# Patient Record
Sex: FEMALE | Employment: FULL TIME | URBAN - METROPOLITAN AREA
[De-identification: names, ages, dates, MRNs, and addresses within clinical notes are randomized per-mention and may not be internally consistent; named-entity substitution may affect disease eponyms.]

---

## 2024-11-20 NOTE — PROGRESS NOTES
History of Present Illness  Chief Complaint   Patient presents with    Right Hip - Pain, New Patient Visit     Xray today       Patient presents with side: right hip pain for 10 years.  It has been worsening over the past  12 months.  The patient localizes the pain predominantly in the groin.  Recently there has been concern for falls and instability.  There is increasing difficulty with activities of daily living and significant disability related to the hip pain.  The patient endorses the following failed non-operative treatments: Rest, ice, elevation, Tylenol, and NSAIDS.   There is increasing frustration with persistent pain and discomfort and decreasing distance of ambulation.    Pain is described as aching, sore, and stiff.  Better with rest, worse with activity.   Pain level: 5  Assistive device: no device  History of surgery on the hip: No  Back pain: No  Numbness or tingling radiating to the lower extremities: No    History reviewed. No pertinent past medical history.    Medication Documentation Review Audit       Reviewed by Mayela Sanz on 11/21/24 at 1641      Medication Order Taking? Sig Documenting Provider Last Dose Status            No Medications to Display                                   No Known Allergies    Social History     Socioeconomic History    Marital status: Single     Spouse name: Not on file    Number of children: Not on file    Years of education: Not on file    Highest education level: Not on file   Occupational History    Not on file   Tobacco Use    Smoking status: Never     Passive exposure: Never    Smokeless tobacco: Never   Substance and Sexual Activity    Alcohol use: Not on file    Drug use: Not on file    Sexual activity: Not on file   Other Topics Concern    Not on file   Social History Narrative    Not on file     Social Drivers of Health     Financial Resource Strain: Low Risk  (11/26/2023)    Received from [facility]    Overall Financial Resource Strain  (CARDIA)     Difficulty of Paying Living Expenses: Not hard at all   Food Insecurity: No Food Insecurity (11/26/2023)    Received from [facility]    Hunger Vital Sign     Worried About Running Out of Food in the Last Year: Never true     Ran Out of Food in the Last Year: Never true   Transportation Needs: No Transportation Needs (11/26/2023)    Received from [facility]    PRAPARE - Transportation     Lack of Transportation (Medical): No     Lack of Transportation (Non-Medical): No   Physical Activity: Unknown (11/26/2023)    Received from [facility]    Exercise Vital Sign     Days of Exercise per Week: 5 days     Minutes of Exercise per Session: Patient declined   Stress: Stress Concern Present (11/26/2023)    Received from [facility]    [place] of Occupational Health - Occupational Stress Questionnaire     Feeling of Stress : To some extent   Social Connections: Unknown (11/26/2023)    Received from [facility]    Social Connection and Isolation Panel [NHANES]     Frequency of Communication with Friends and Family: More than three times a week     Frequency of Social Gatherings with Friends and Family: Once a week     Attends Voodoo Services: Patient declined     Active Member of Clubs or Organizations: Patient declined     Attends Club or Organization Meetings: Patient declined     Marital Status:    Intimate Partner Violence: Not At Risk (11/26/2023)    Received from [facility]    Humiliation, Afraid, Rape, and Kick questionnaire     Fear of Current or Ex-Partner: No     Emotionally Abused: No     Physically Abused: No     Sexually Abused: No   Housing Stability: Low Risk  (11/26/2023)    Received from [facility]    Housing Stability Vital Sign     Unable to Pay for Housing in the Last Year: No     Number of Places Lived in the Last Year: 1     Unstable Housing in the Last Year: No       History reviewed. No pertinent surgical history.    No images are  attached to the encounter.    BMI  26       Review of Systems   GENERAL: Negative for malaise, significant weight loss, fever  MUSCULOSKELETAL: see HPI  NEURO:  Negative     Exam  side: right Hip:  Antalgic gait  Negative Trendelenburg sign  Skin healthy and intact  No tenderness to palpation over lumbar spine  Minimal tenderness over greater trochanter     Range of motion:  Flexion: 120 internal rotation: 20 external rotation: 20 abduction: 30  Pain with: decreased ROM  pain with rotation  No weakness with resisted hip flexion, abduction or adduction     Negative straight leg raise  Neurovascular exam normal distally     Radiographs  XR hip right with pelvis when performed 2 or 3 views  Interpreted By:  Rony Whitley,   STUDY:  XR HIP RIGHT WITH PELVIS WHEN PERFORMED 2 OR 3 VIEWS; ; 11/21/2024  4:15 pm      INDICATION:  Signs/Symptoms:pain.      ACCESSION NUMBER(S):  [id number]      ORDERING CLINICIAN:  RONY WHITLEY      FINDINGS:  Right hip films are negative for fracture, dislocation or destructive  lesion. There is severe degenerative arthrosis of the right hip with  loss of joint space and spurring.          Signed by: Rony Whitley 11/21/2024 4:46 PM  Dictation workstation:   [id number]         Assessment  Osteoarthrosis side: right hip     Plan  We discussed with the patient the diagnosis of severe degenerative joint disease of the hip.  We reviewed an evidence-based approach to osteoarthritis of the hip.  We strongly encouraged low-impact aerobic activity and non-opioid analgesics.  We discussed the role of physical therapy to aid in strengthening and gait training.  We discussed temporary pain relief with corticosteroid injections and the associated risks.      The patient elected for the patient has failed to improve with multiple non-operative modalities.  There is increasing difficulty with activities of daily living and concern for falls.  The patient is endorsing severe pain and disability.       We had a  lengthy discussion regarding total hip arthroplasty including the orthopaedic risks, including but not limited to, instability, infection, hematoma, early aseptic loosening, neurologic or vascular injury, clicking, limb length inequality and incomplete relief of pain.  We reviewed the medical risks, including but not limited to, deep venous thrombosis, pulmonary embolism, and cardiovascular/pulmonary issues.     We discussed the anticipated longevity of the implants and potential for revision surgery.  We also discussed anticoagulation, rehabilitation goals, and the hospital course.  We discussed the likelihood of opioid analgesics and risks associated with them.  The next step is to obtain medical risk stratification and encouraged the pre-operative information seminar at the hospital.  We are happy to provide assistance and counseling if the patient has any additional concerns..

## 2024-11-21 ENCOUNTER — OFFICE VISIT (OUTPATIENT)
Dept: ORTHOPEDIC SURGERY | Facility: CLINIC | Age: 49
End: 2024-11-21
Payer: COMMERCIAL

## 2024-11-21 ENCOUNTER — HOSPITAL ENCOUNTER (OUTPATIENT)
Dept: RADIOLOGY | Facility: CLINIC | Age: 49
Discharge: HOME | End: 2024-11-21
Payer: COMMERCIAL

## 2024-11-21 VITALS — WEIGHT: 152.4 LBS | BODY MASS INDEX: 26.02 KG/M2 | HEIGHT: 64 IN

## 2024-11-21 DIAGNOSIS — M16.11 PRIMARY OSTEOARTHRITIS OF RIGHT HIP: Primary | ICD-10-CM

## 2024-11-21 DIAGNOSIS — M25.551 PAIN OF RIGHT HIP: ICD-10-CM

## 2024-11-21 PROCEDURE — 3008F BODY MASS INDEX DOCD: CPT | Performed by: ORTHOPAEDIC SURGERY

## 2024-11-21 PROCEDURE — 99213 OFFICE O/P EST LOW 20 MIN: CPT | Mod: 57 | Performed by: ORTHOPAEDIC SURGERY

## 2024-11-21 PROCEDURE — 73502 X-RAY EXAM HIP UNI 2-3 VIEWS: CPT | Mod: RT

## 2024-11-21 PROCEDURE — 99204 OFFICE O/P NEW MOD 45 MIN: CPT | Performed by: ORTHOPAEDIC SURGERY

## 2024-11-21 PROCEDURE — 1036F TOBACCO NON-USER: CPT | Performed by: ORTHOPAEDIC SURGERY

## 2024-11-21 RX ORDER — BUPROPION HYDROCHLORIDE 300 MG/1
1 TABLET ORAL
COMMUNITY
Start: 2024-07-23

## 2024-11-21 RX ORDER — ESCITALOPRAM OXALATE 10 MG/1
10 TABLET ORAL
COMMUNITY
Start: 2024-10-03

## 2024-11-21 RX ORDER — TIZANIDINE 2 MG/1
2 TABLET ORAL NIGHTLY
COMMUNITY
Start: 2024-10-04

## 2024-12-05 PROBLEM — M16.11 UNILATERAL PRIMARY OSTEOARTHRITIS, RIGHT HIP: Status: ACTIVE | Noted: 2024-12-04

## 2025-01-27 ENCOUNTER — HOSPITAL ENCOUNTER (OUTPATIENT)
Dept: CARDIOLOGY | Facility: HOSPITAL | Age: 50
Discharge: HOME | End: 2025-01-27
Payer: COMMERCIAL

## 2025-01-27 ENCOUNTER — PRE-ADMISSION TESTING (OUTPATIENT)
Dept: PREADMISSION TESTING | Facility: HOSPITAL | Age: 50
End: 2025-01-27
Payer: COMMERCIAL

## 2025-01-27 VITALS
HEART RATE: 70 BPM | BODY MASS INDEX: 27.63 KG/M2 | DIASTOLIC BLOOD PRESSURE: 78 MMHG | OXYGEN SATURATION: 100 % | HEIGHT: 64 IN | SYSTOLIC BLOOD PRESSURE: 152 MMHG | RESPIRATION RATE: 16 BRPM | WEIGHT: 161.82 LBS

## 2025-01-27 DIAGNOSIS — M16.9 OSTEOARTHRITIS OF HIP: ICD-10-CM

## 2025-01-27 LAB
ALBUMIN SERPL BCP-MCNC: 4.3 G/DL (ref 3.4–5)
ALP SERPL-CCNC: 73 U/L (ref 33–110)
ALT SERPL W P-5'-P-CCNC: 16 U/L (ref 7–45)
ANION GAP SERPL CALC-SCNC: 9 MMOL/L (ref 10–20)
AST SERPL W P-5'-P-CCNC: 16 U/L (ref 9–39)
BASOPHILS # BLD AUTO: 0.07 X10*3/UL (ref 0–0.1)
BASOPHILS NFR BLD AUTO: 0.8 %
BILIRUB SERPL-MCNC: 0.3 MG/DL (ref 0–1.2)
BUN SERPL-MCNC: 16 MG/DL (ref 6–23)
CALCIUM SERPL-MCNC: 9.2 MG/DL (ref 8.6–10.3)
CHLORIDE SERPL-SCNC: 106 MMOL/L (ref 98–107)
CO2 SERPL-SCNC: 27 MMOL/L (ref 21–32)
CREAT SERPL-MCNC: 0.91 MG/DL (ref 0.5–1.05)
EGFRCR SERPLBLD CKD-EPI 2021: 77 ML/MIN/1.73M*2
EOSINOPHIL # BLD AUTO: 0.42 X10*3/UL (ref 0–0.7)
EOSINOPHIL NFR BLD AUTO: 5 %
ERYTHROCYTE [DISTWIDTH] IN BLOOD BY AUTOMATED COUNT: 12 % (ref 11.5–14.5)
EST. AVERAGE GLUCOSE BLD GHB EST-MCNC: 97 MG/DL
GLUCOSE SERPL-MCNC: 94 MG/DL (ref 74–99)
HBA1C MFR BLD: 5 %
HCT VFR BLD AUTO: 41 % (ref 36–46)
HGB BLD-MCNC: 13.9 G/DL (ref 12–16)
IMM GRANULOCYTES # BLD AUTO: 0.03 X10*3/UL (ref 0–0.7)
IMM GRANULOCYTES NFR BLD AUTO: 0.4 % (ref 0–0.9)
INR PPP: 1 (ref 0.9–1.1)
LYMPHOCYTES # BLD AUTO: 1.27 X10*3/UL (ref 1.2–4.8)
LYMPHOCYTES NFR BLD AUTO: 15.3 %
MCH RBC QN AUTO: 31.5 PG (ref 26–34)
MCHC RBC AUTO-ENTMCNC: 33.9 G/DL (ref 32–36)
MCV RBC AUTO: 93 FL (ref 80–100)
MONOCYTES # BLD AUTO: 0.51 X10*3/UL (ref 0.1–1)
MONOCYTES NFR BLD AUTO: 6.1 %
NEUTROPHILS # BLD AUTO: 6.02 X10*3/UL (ref 1.2–7.7)
NEUTROPHILS NFR BLD AUTO: 72.4 %
NRBC BLD-RTO: 0 /100 WBCS (ref 0–0)
PLATELET # BLD AUTO: 298 X10*3/UL (ref 150–450)
POTASSIUM SERPL-SCNC: 4.7 MMOL/L (ref 3.5–5.3)
PROT SERPL-MCNC: 7 G/DL (ref 6.4–8.2)
PROTHROMBIN TIME: 11.7 SECONDS (ref 9.8–12.8)
RBC # BLD AUTO: 4.41 X10*6/UL (ref 4–5.2)
SODIUM SERPL-SCNC: 137 MMOL/L (ref 136–145)
WBC # BLD AUTO: 8.3 X10*3/UL (ref 4.4–11.3)

## 2025-01-27 PROCEDURE — 85610 PROTHROMBIN TIME: CPT

## 2025-01-27 PROCEDURE — 87081 CULTURE SCREEN ONLY: CPT | Mod: ELYLAB

## 2025-01-27 PROCEDURE — 83036 HEMOGLOBIN GLYCOSYLATED A1C: CPT | Mod: ELYLAB

## 2025-01-27 PROCEDURE — 36415 COLL VENOUS BLD VENIPUNCTURE: CPT

## 2025-01-27 PROCEDURE — 93010 ELECTROCARDIOGRAM REPORT: CPT | Performed by: INTERNAL MEDICINE

## 2025-01-27 PROCEDURE — 93005 ELECTROCARDIOGRAM TRACING: CPT

## 2025-01-27 PROCEDURE — 85025 COMPLETE CBC W/AUTO DIFF WBC: CPT

## 2025-01-27 PROCEDURE — 84075 ASSAY ALKALINE PHOSPHATASE: CPT

## 2025-01-27 RX ORDER — CEPHALEXIN 500 MG/1
500 CAPSULE ORAL SEE ADMIN INSTRUCTIONS
COMMUNITY
Start: 2024-05-08

## 2025-01-27 NOTE — PREPROCEDURE INSTRUCTIONS
KNEE & HIP JOINT REPLACEMENT PRE-OPERATIVE INSTRUCTIONS     You will receive notification one business day prior to your surgery to confirm your arrival time and any additional information between 2 P.M. - 5 P.M. It is important that you answer your phone and/or check your messages during this time.     You may see in [facility] [employer] your surgery start time change several times even up to the day before your procedure. Please disregard those times and only follow the time given by the  who will be notifying you via phone and not a text.     Please arrive at your scheduled time to avoid delay or cancelled surgery.     Please enter the building through either the Main Entrance in front of the hospital or from the Parking Garage Walk Way Bridge. From the parking garage, which is free, take the 2nd Floor Walkway Bridge into the hospital and check in at the [facility] Desk as you enter the hospital directly in front of you. If you enter through the Main Entrance take the elevator off the lobby on the right labeled “A” to the 2nd floor and check in at the [facility] desk as you exit the elevator. Wheelchairs are available for use if using the Main Entrance.     Handicap parking in the land lot, in front of hospital by main entrance, wheelchairs are available at this entrance     ?   INSTRUCTIONS:   Please contact your doctor’s office, who is doing procedure, about any changes in your health, bad cold, fever, sore throat, or COVID within last 4 weeks     Talk to your surgeon for instructions if you should stop your aspirin, blood thinner, diabetes medicines, weight loss medications, multivitamins or over the counter supplements since many surgeons have you adjust or stop these medications prior to procedure. The doctor’s office may have you contact the prescribing doctor for medication adjustments for your surgery.     If you take certain medications like Beta Blocker or Anti-Seizure  medication, you may have to take them the morning of procedure with a sip of water. If this is the case your surgeons office should let you know, and the PAT nurses will follow up when they speak to you to verify you are aware.     If not staying overnight after your surgery, and you are receiving any type of anesthesia with your surgery, you must have an adult (age 18 or older) immediately available to drive you home after surgery or your procedure will be cancelled. You may be discharged home after surgery per an Uber, Lyft, Taxi or any other transportation service as long as the responsible adult (18 or older) is in the vehicle with you at time of discharge. The  of these transportation services is not responsible for your care and cannot be consider a responsible adult. We also highly recommend you have someone stay with you for the entire day and night of your surgery.     All jewelry and piercings must be removed. If you are unable to remove an item or have a dermal piercing, please be sure to tell the nurse when you arrive for surgery.     Nail polish must be removed off one finger of each hand     Make-up or other beauty products (lotion, deodorant, hairspray, perfume, etc.) must be removed or not used for day of surgery.     Avoid smoking, consuming alcohol, or any medical or recreational drug use for 24 hours before surgery.     Do not wear contacts to hospital, bring your glasses and a case     Leave valuables at home except photo ID, insurance card and any co-payment that has been requested by hospital.     For adults who are unable to consent or make medical decisions for themselves, a legal guardian or Power of  must accompany them to the surgery center. If this is not possible, please call [phone] to make additional arrangements.  If there is any guardianship or legal Power of  paperwork, please bring them the day of surgery.     Wear comfortable, loose fitting clothing into  the procedure.  While your admitted you are asked to bring short sleeve shirts, shorts (loose like pajamas, sweat shorts), and tennis shoes/sneakers.  No slip on shoes.     Please bring your 2-Wheeled Walker with you the day of surgery and the [facility] Booklet that was given to you during your PAT appointment.     The nurse practitioners, , , physical therapist, and occupational therapist will all discuss and work with you during your stay to help with discharge, physical therapy and any other needs. They also may discuss a program called Meds to Beds, where postoperative medications prescribed to you after surgery will be filled and delivered to your beside before you leave, so that you do not need to stop at the pharmacy on the way home    If you use a CPAP or BIPAP, please make sure the PAT nurse was able to get the settings from you, if not please inform the nurses the day of surgery of your settings you use at home.     Additional instructions about eating and drinking before surgery:     Do not eat any solid foods?or drink anything after midnight the night prior to your procedure. Milk, nutritional drinks/supplements, and infant formula are considered solid foods.  This also includes no gum, candy, lozenges, ice, or any other oral consumption.     CHG SOAP & ORAL RINSE   In regards to bathing, please follow the instructions explained to you at the PAT appointment about taking a showering with the CHG soap the 5 nights prior to your surgery.       During your PAT Appointment you were given [employer] CHG Wash Soap (bottles of bubble gum pink soap) to use before procedure.  Begin using the CHG soap 5 days before your scheduled surgery.  Be sure to sleep on clean sheets - change your sheets the first night you do this cleansing process (you don't not need to change them every night).     CHG SOAP INSTRUCTIONS:  Begin using the CHG soap 5 days prior to your scheduled  surgery.  You will wash and rinse your face and genitals with normal soap.  The night before surgery is the ONLY TIME you use the CHG SOAP for your hair, and do NOT use conditioner after washing with the CHG Soap.  For the rest of the showers the 5 days prior to surgery you will use normal shampoo.  Hair extensions should be removed.  Then apply CHG soap solution to a clean wet washcloth.     Turn the water off or move away from the water spray to avoid premature rinsing of the CHG soap as you apply it.   Avoid getting the CHG soap in your ears, eyes, and mouth.  Apply the CHG soap to entire body from the neck down EXCEPT your face and genitals.  Allow the CHG soap to sit for 3 minutes on your skin.  Then turn on water and rinse the CHG solution off your body completely.    DO NOT wash with regular soap after you have used the CHG soap   Pat yourself dry with a clean, fresh-laundered towel when you get out of the shower and clean Pj's  DO NOT apply any powders, deodorants, or lotions after shower   Be aware the CHG soap will stain fabrics if you wash them with bleach after use.   Make sure to pay special attention to cleaning area(s) where your incision(s) will be located. Avoid scrubbing your skin to hard.  You will repeat this same process steps 1-12 for each shower you take prior to surgery.  The morning of  the surgery is the fifth day.      ORAL RINSE   You will receive a call or notification from your pharmacy to  a prescription prior to procedure.  Be sure to  the prescription oral rinse from your local pharmacy.   You will be using the oral rinse the night before and the morning of surgery.    Take a cap full of the solution, 15mL   Swish (gargle if you can) the mouthwash in your mouth for at least 30 seconds, (DO NOT SWALLOW), and spit out   After the oral CHG rinse, do not rinse your mouth with water, drink, or eat.      If you have to take a medication the morning of surgery as instructed by  your surgeon- please take that medication with a sip of water prior to doing the oral rinse the day of surgery.       ?If you have any questions or concerns, please call Pre-Admission Testing at [phone] or your Physician’s office   Dr. Rony Whitley  [phone]  [facility] General Line: [phone]

## 2025-01-29 LAB — STAPHYLOCOCCUS SPEC CULT: NORMAL

## 2025-01-31 DIAGNOSIS — Z01.818 PRE-OPERATIVE CLEARANCE: Primary | ICD-10-CM

## 2025-01-31 LAB
ATRIAL RATE: 54 BPM
P AXIS: 72 DEGREES
P OFFSET: 193 MS
P ONSET: 137 MS
PR INTERVAL: 162 MS
Q ONSET: 218 MS
QRS COUNT: 9 BEATS
QRS DURATION: 86 MS
QT INTERVAL: 422 MS
QTC CALCULATION(BAZETT): 400 MS
QTC FREDERICIA: 407 MS
R AXIS: 69 DEGREES
T AXIS: 57 DEGREES
T OFFSET: 429 MS
VENTRICULAR RATE: 54 BPM

## 2025-01-31 RX ORDER — CHLORHEXIDINE GLUCONATE ORAL RINSE 1.2 MG/ML
15 SOLUTION DENTAL DAILY
Qty: 30 ML | Refills: 0 | Status: SHIPPED | OUTPATIENT
Start: 2025-01-31 | End: 2025-02-02

## 2025-02-05 ENCOUNTER — TELEPHONE (OUTPATIENT)
Dept: ORTHOPEDIC SURGERY | Facility: CLINIC | Age: 50
End: 2025-02-05
Payer: COMMERCIAL

## 2025-02-10 ENCOUNTER — ANESTHESIA (OUTPATIENT)
Dept: OPERATING ROOM | Facility: HOSPITAL | Age: 50
End: 2025-02-10
Payer: COMMERCIAL

## 2025-02-10 ENCOUNTER — APPOINTMENT (OUTPATIENT)
Dept: RADIOLOGY | Facility: HOSPITAL | Age: 50
End: 2025-02-10
Payer: COMMERCIAL

## 2025-02-10 ENCOUNTER — HOSPITAL ENCOUNTER (OUTPATIENT)
Facility: HOSPITAL | Age: 50
Discharge: HOME | End: 2025-02-11
Attending: ORTHOPAEDIC SURGERY | Admitting: ORTHOPAEDIC SURGERY
Payer: COMMERCIAL

## 2025-02-10 ENCOUNTER — ANESTHESIA EVENT (OUTPATIENT)
Dept: OPERATING ROOM | Facility: HOSPITAL | Age: 50
End: 2025-02-10
Payer: COMMERCIAL

## 2025-02-10 DIAGNOSIS — M16.11 UNILATERAL PRIMARY OSTEOARTHRITIS, RIGHT HIP: Primary | ICD-10-CM

## 2025-02-10 LAB — PREGNANCY TEST URINE, POC: NEGATIVE

## 2025-02-10 PROCEDURE — 2500000001 HC RX 250 WO HCPCS SELF ADMINISTERED DRUGS (ALT 637 FOR MEDICARE OP): Performed by: PHYSICIAN ASSISTANT

## 2025-02-10 PROCEDURE — 7100000001 HC RECOVERY ROOM TIME - INITIAL BASE CHARGE: Performed by: ORTHOPAEDIC SURGERY

## 2025-02-10 PROCEDURE — 2500000001 HC RX 250 WO HCPCS SELF ADMINISTERED DRUGS (ALT 637 FOR MEDICARE OP): Performed by: STUDENT IN AN ORGANIZED HEALTH CARE EDUCATION/TRAINING PROGRAM

## 2025-02-10 PROCEDURE — C1713 ANCHOR/SCREW BN/BN,TIS/BN: HCPCS | Performed by: ORTHOPAEDIC SURGERY

## 2025-02-10 PROCEDURE — 3600000017 HC OR TIME - EACH INCREMENTAL 1 MINUTE - PROCEDURE LEVEL SIX: Performed by: ORTHOPAEDIC SURGERY

## 2025-02-10 PROCEDURE — 3600000018 HC OR TIME - INITIAL BASE CHARGE - PROCEDURE LEVEL SIX: Performed by: ORTHOPAEDIC SURGERY

## 2025-02-10 PROCEDURE — 2500000005 HC RX 250 GENERAL PHARMACY W/O HCPCS: Performed by: ORTHOPAEDIC SURGERY

## 2025-02-10 PROCEDURE — 72170 X-RAY EXAM OF PELVIS: CPT | Performed by: RADIOLOGY

## 2025-02-10 PROCEDURE — 7100000011 HC EXTENDED STAY RECOVERY HOURLY - NURSING UNIT

## 2025-02-10 PROCEDURE — 2500000004 HC RX 250 GENERAL PHARMACY W/ HCPCS (ALT 636 FOR OP/ED): Performed by: PHYSICIAN ASSISTANT

## 2025-02-10 PROCEDURE — 2500000001 HC RX 250 WO HCPCS SELF ADMINISTERED DRUGS (ALT 637 FOR MEDICARE OP): Performed by: ORTHOPAEDIC SURGERY

## 2025-02-10 PROCEDURE — 2500000004 HC RX 250 GENERAL PHARMACY W/ HCPCS (ALT 636 FOR OP/ED): Performed by: ORTHOPAEDIC SURGERY

## 2025-02-10 PROCEDURE — 2500000004 HC RX 250 GENERAL PHARMACY W/ HCPCS (ALT 636 FOR OP/ED): Performed by: STUDENT IN AN ORGANIZED HEALTH CARE EDUCATION/TRAINING PROGRAM

## 2025-02-10 PROCEDURE — 81025 URINE PREGNANCY TEST: CPT | Performed by: PHYSICIAN ASSISTANT

## 2025-02-10 PROCEDURE — C1776 JOINT DEVICE (IMPLANTABLE): HCPCS | Performed by: ORTHOPAEDIC SURGERY

## 2025-02-10 PROCEDURE — 3700000001 HC GENERAL ANESTHESIA TIME - INITIAL BASE CHARGE: Performed by: ORTHOPAEDIC SURGERY

## 2025-02-10 PROCEDURE — 2720000007 HC OR 272 NO HCPCS: Performed by: ORTHOPAEDIC SURGERY

## 2025-02-10 PROCEDURE — 2780000003 HC OR 278 NO HCPCS: Performed by: ORTHOPAEDIC SURGERY

## 2025-02-10 PROCEDURE — 2500000001 HC RX 250 WO HCPCS SELF ADMINISTERED DRUGS (ALT 637 FOR MEDICARE OP)

## 2025-02-10 PROCEDURE — RXMED WILLOW AMBULATORY MEDICATION CHARGE

## 2025-02-10 PROCEDURE — 72170 X-RAY EXAM OF PELVIS: CPT

## 2025-02-10 PROCEDURE — 3700000002 HC GENERAL ANESTHESIA TIME - EACH INCREMENTAL 1 MINUTE: Performed by: ORTHOPAEDIC SURGERY

## 2025-02-10 PROCEDURE — A6213 FOAM DRG >16<=48 SQ IN W/BDR: HCPCS | Performed by: ORTHOPAEDIC SURGERY

## 2025-02-10 PROCEDURE — 2500000002 HC RX 250 W HCPCS SELF ADMINISTERED DRUGS (ALT 637 FOR MEDICARE OP, ALT 636 FOR OP/ED): Performed by: ORTHOPAEDIC SURGERY

## 2025-02-10 PROCEDURE — 2500000004 HC RX 250 GENERAL PHARMACY W/ HCPCS (ALT 636 FOR OP/ED): Performed by: NURSE ANESTHETIST, CERTIFIED REGISTERED

## 2025-02-10 PROCEDURE — 2500000002 HC RX 250 W HCPCS SELF ADMINISTERED DRUGS (ALT 637 FOR MEDICARE OP, ALT 636 FOR OP/ED): Performed by: PHYSICIAN ASSISTANT

## 2025-02-10 PROCEDURE — 2500000005 HC RX 250 GENERAL PHARMACY W/O HCPCS: Performed by: PHYSICIAN ASSISTANT

## 2025-02-10 PROCEDURE — 7100000002 HC RECOVERY ROOM TIME - EACH INCREMENTAL 1 MINUTE: Performed by: ORTHOPAEDIC SURGERY

## 2025-02-10 DEVICE — 6.5MM LOW PROFILE HEX SCREW 25MM
Type: IMPLANTABLE DEVICE | Site: HIP | Status: FUNCTIONAL
Brand: TRIDENT II

## 2025-02-10 DEVICE — TRIDENT II TRITANIUM CLUSTER 54E
Type: IMPLANTABLE DEVICE | Site: HIP | Status: FUNCTIONAL
Brand: TRIDENT II

## 2025-02-10 DEVICE — TRIDENT X3 0 DEGREE POLYETHYLENE INSERT
Type: IMPLANTABLE DEVICE | Site: HIP | Status: FUNCTIONAL
Brand: TRIDENT X3 INSERT

## 2025-02-10 DEVICE — STEM, COLLARD, INSIGNIA, 2 HIGH: Type: IMPLANTABLE DEVICE | Site: HIP | Status: FUNCTIONAL

## 2025-02-10 DEVICE — CERAMIC V40 FEMORAL HEAD
Type: IMPLANTABLE DEVICE | Site: HIP | Status: FUNCTIONAL
Brand: BIOLOX

## 2025-02-10 RX ORDER — ONDANSETRON 4 MG/1
4 TABLET, ORALLY DISINTEGRATING ORAL EVERY 8 HOURS PRN
Status: DISCONTINUED | OUTPATIENT
Start: 2025-02-10 | End: 2025-02-11 | Stop reason: HOSPADM

## 2025-02-10 RX ORDER — GLYCOPYRROLATE 0.2 MG/ML
INJECTION INTRAMUSCULAR; INTRAVENOUS AS NEEDED
Status: DISCONTINUED | OUTPATIENT
Start: 2025-02-10 | End: 2025-02-10

## 2025-02-10 RX ORDER — ACETAMINOPHEN 325 MG/1
650 TABLET ORAL EVERY 6 HOURS SCHEDULED
Status: DISCONTINUED | OUTPATIENT
Start: 2025-02-10 | End: 2025-02-11 | Stop reason: HOSPADM

## 2025-02-10 RX ORDER — CEFAZOLIN SODIUM 2 G/100ML
2 INJECTION, SOLUTION INTRAVENOUS ONCE
Status: COMPLETED | OUTPATIENT
Start: 2025-02-10 | End: 2025-02-10

## 2025-02-10 RX ORDER — CEFAZOLIN SODIUM 2 G/50ML
2 SOLUTION INTRAVENOUS EVERY 8 HOURS
Status: DISCONTINUED | OUTPATIENT
Start: 2025-02-10 | End: 2025-02-10

## 2025-02-10 RX ORDER — PROCHLORPERAZINE EDISYLATE 5 MG/ML
10 INJECTION INTRAMUSCULAR; INTRAVENOUS EVERY 6 HOURS PRN
Status: DISCONTINUED | OUTPATIENT
Start: 2025-02-10 | End: 2025-02-11 | Stop reason: HOSPADM

## 2025-02-10 RX ORDER — TRANEXAMIC ACID 650 MG/1
1950 TABLET ORAL ONCE
Status: COMPLETED | OUTPATIENT
Start: 2025-02-10 | End: 2025-02-10

## 2025-02-10 RX ORDER — HYDROCODONE BITARTRATE AND ACETAMINOPHEN 5; 325 MG/1; MG/1
1 TABLET ORAL EVERY 6 HOURS PRN
Status: DISCONTINUED | OUTPATIENT
Start: 2025-02-10 | End: 2025-02-11 | Stop reason: HOSPADM

## 2025-02-10 RX ORDER — OXYCODONE HYDROCHLORIDE 5 MG/1
10 TABLET ORAL EVERY 4 HOURS PRN
Status: DISCONTINUED | OUTPATIENT
Start: 2025-02-10 | End: 2025-02-10

## 2025-02-10 RX ORDER — PROCHLORPERAZINE 25 MG/1
25 SUPPOSITORY RECTAL EVERY 12 HOURS PRN
Status: DISCONTINUED | OUTPATIENT
Start: 2025-02-10 | End: 2025-02-11 | Stop reason: HOSPADM

## 2025-02-10 RX ORDER — PROCHLORPERAZINE MALEATE 5 MG
10 TABLET ORAL EVERY 6 HOURS PRN
Status: DISCONTINUED | OUTPATIENT
Start: 2025-02-10 | End: 2025-02-11 | Stop reason: HOSPADM

## 2025-02-10 RX ORDER — OXYCODONE HYDROCHLORIDE 5 MG/1
10 TABLET ORAL EVERY 6 HOURS PRN
Status: DISCONTINUED | OUTPATIENT
Start: 2025-02-10 | End: 2025-02-10

## 2025-02-10 RX ORDER — FENTANYL CITRATE 50 UG/ML
25 INJECTION, SOLUTION INTRAMUSCULAR; INTRAVENOUS EVERY 5 MIN PRN
Status: DISCONTINUED | OUTPATIENT
Start: 2025-02-10 | End: 2025-02-10 | Stop reason: HOSPADM

## 2025-02-10 RX ORDER — ASPIRIN 81 MG/1
81 TABLET ORAL 2 TIMES DAILY
Status: DISCONTINUED | OUTPATIENT
Start: 2025-02-10 | End: 2025-02-11 | Stop reason: HOSPADM

## 2025-02-10 RX ORDER — BUPIVACAINE HYDROCHLORIDE 7.5 MG/ML
INJECTION, SOLUTION INTRASPINAL AS NEEDED
Status: DISCONTINUED | OUTPATIENT
Start: 2025-02-10 | End: 2025-02-10

## 2025-02-10 RX ORDER — DOCUSATE SODIUM 100 MG/1
100 CAPSULE, LIQUID FILLED ORAL 2 TIMES DAILY
Status: DISCONTINUED | OUTPATIENT
Start: 2025-02-10 | End: 2025-02-11 | Stop reason: HOSPADM

## 2025-02-10 RX ORDER — OXYCODONE HYDROCHLORIDE 5 MG/1
5 TABLET ORAL EVERY 4 HOURS PRN
Status: DISCONTINUED | OUTPATIENT
Start: 2025-02-10 | End: 2025-02-10

## 2025-02-10 RX ORDER — SODIUM CHLORIDE 0.9 G/100ML
INJECTION, SOLUTION IRRIGATION AS NEEDED
Status: DISCONTINUED | OUTPATIENT
Start: 2025-02-10 | End: 2025-02-10 | Stop reason: HOSPADM

## 2025-02-10 RX ORDER — CEFAZOLIN SODIUM 2 G/100ML
2 INJECTION, SOLUTION INTRAVENOUS EVERY 8 HOURS
Status: COMPLETED | OUTPATIENT
Start: 2025-02-10 | End: 2025-02-11

## 2025-02-10 RX ORDER — DIPHENHYDRAMINE HYDROCHLORIDE 50 MG/ML
12.5 INJECTION INTRAMUSCULAR; INTRAVENOUS ONCE AS NEEDED
Status: DISCONTINUED | OUTPATIENT
Start: 2025-02-10 | End: 2025-02-10 | Stop reason: HOSPADM

## 2025-02-10 RX ORDER — WATER 1 ML/ML
IRRIGANT IRRIGATION AS NEEDED
Status: DISCONTINUED | OUTPATIENT
Start: 2025-02-10 | End: 2025-02-10 | Stop reason: HOSPADM

## 2025-02-10 RX ORDER — LIDOCAINE HYDROCHLORIDE 20 MG/ML
INJECTION, SOLUTION INFILTRATION; PERINEURAL AS NEEDED
Status: DISCONTINUED | OUTPATIENT
Start: 2025-02-10 | End: 2025-02-10

## 2025-02-10 RX ORDER — CELECOXIB 200 MG/1
200 CAPSULE ORAL ONCE
Status: COMPLETED | OUTPATIENT
Start: 2025-02-10 | End: 2025-02-10

## 2025-02-10 RX ORDER — BISACODYL 5 MG
10 TABLET, DELAYED RELEASE (ENTERIC COATED) ORAL DAILY PRN
Status: DISCONTINUED | OUTPATIENT
Start: 2025-02-10 | End: 2025-02-11 | Stop reason: HOSPADM

## 2025-02-10 RX ORDER — ALBUTEROL SULFATE 0.83 MG/ML
2.5 SOLUTION RESPIRATORY (INHALATION) ONCE AS NEEDED
Status: DISCONTINUED | OUTPATIENT
Start: 2025-02-10 | End: 2025-02-10 | Stop reason: HOSPADM

## 2025-02-10 RX ORDER — CYCLOBENZAPRINE HCL 10 MG
10 TABLET ORAL 3 TIMES DAILY PRN
Status: DISCONTINUED | OUTPATIENT
Start: 2025-02-10 | End: 2025-02-11 | Stop reason: HOSPADM

## 2025-02-10 RX ORDER — HYDRALAZINE HYDROCHLORIDE 20 MG/ML
5 INJECTION INTRAMUSCULAR; INTRAVENOUS EVERY 30 MIN PRN
Status: DISCONTINUED | OUTPATIENT
Start: 2025-02-10 | End: 2025-02-10 | Stop reason: HOSPADM

## 2025-02-10 RX ORDER — FAMOTIDINE 10 MG/ML
INJECTION INTRAVENOUS AS NEEDED
Status: DISCONTINUED | OUTPATIENT
Start: 2025-02-10 | End: 2025-02-10

## 2025-02-10 RX ORDER — SODIUM CHLORIDE, SODIUM LACTATE, POTASSIUM CHLORIDE, CALCIUM CHLORIDE 600; 310; 30; 20 MG/100ML; MG/100ML; MG/100ML; MG/100ML
50 INJECTION, SOLUTION INTRAVENOUS CONTINUOUS
Status: ACTIVE | OUTPATIENT
Start: 2025-02-10 | End: 2025-02-11

## 2025-02-10 RX ORDER — TRANEXAMIC ACID 650 MG/1
1950 TABLET ORAL ONCE
Status: COMPLETED | OUTPATIENT
Start: 2025-02-11 | End: 2025-02-11

## 2025-02-10 RX ORDER — NALOXONE HYDROCHLORIDE 1 MG/ML
0.2 INJECTION INTRAMUSCULAR; INTRAVENOUS; SUBCUTANEOUS EVERY 5 MIN PRN
Status: DISCONTINUED | OUTPATIENT
Start: 2025-02-10 | End: 2025-02-11 | Stop reason: HOSPADM

## 2025-02-10 RX ORDER — LABETALOL HYDROCHLORIDE 5 MG/ML
5 INJECTION, SOLUTION INTRAVENOUS ONCE AS NEEDED
Status: DISCONTINUED | OUTPATIENT
Start: 2025-02-10 | End: 2025-02-10 | Stop reason: HOSPADM

## 2025-02-10 RX ORDER — PROPOFOL 10 MG/ML
INJECTION, EMULSION INTRAVENOUS AS NEEDED
Status: DISCONTINUED | OUTPATIENT
Start: 2025-02-10 | End: 2025-02-10

## 2025-02-10 RX ORDER — SODIUM CHLORIDE, SODIUM LACTATE, POTASSIUM CHLORIDE, CALCIUM CHLORIDE 600; 310; 30; 20 MG/100ML; MG/100ML; MG/100ML; MG/100ML
50 INJECTION, SOLUTION INTRAVENOUS CONTINUOUS
Status: DISCONTINUED | OUTPATIENT
Start: 2025-02-10 | End: 2025-02-11 | Stop reason: HOSPADM

## 2025-02-10 RX ORDER — MIDAZOLAM HYDROCHLORIDE 1 MG/ML
INJECTION, SOLUTION INTRAMUSCULAR; INTRAVENOUS AS NEEDED
Status: DISCONTINUED | OUTPATIENT
Start: 2025-02-10 | End: 2025-02-10

## 2025-02-10 RX ORDER — ONDANSETRON HYDROCHLORIDE 2 MG/ML
4 INJECTION, SOLUTION INTRAVENOUS EVERY 8 HOURS PRN
Status: DISCONTINUED | OUTPATIENT
Start: 2025-02-10 | End: 2025-02-11 | Stop reason: HOSPADM

## 2025-02-10 RX ORDER — MEPERIDINE HYDROCHLORIDE 25 MG/ML
12.5 INJECTION INTRAMUSCULAR; INTRAVENOUS; SUBCUTANEOUS EVERY 10 MIN PRN
Status: DISCONTINUED | OUTPATIENT
Start: 2025-02-10 | End: 2025-02-10 | Stop reason: HOSPADM

## 2025-02-10 RX ORDER — SODIUM CHLORIDE, SODIUM LACTATE, POTASSIUM CHLORIDE, CALCIUM CHLORIDE 600; 310; 30; 20 MG/100ML; MG/100ML; MG/100ML; MG/100ML
100 INJECTION, SOLUTION INTRAVENOUS CONTINUOUS
Status: DISCONTINUED | OUTPATIENT
Start: 2025-02-10 | End: 2025-02-10 | Stop reason: HOSPADM

## 2025-02-10 RX ORDER — HYDROCODONE BITARTRATE AND ACETAMINOPHEN 5; 325 MG/1; MG/1
2 TABLET ORAL EVERY 6 HOURS PRN
Status: DISCONTINUED | OUTPATIENT
Start: 2025-02-10 | End: 2025-02-11 | Stop reason: HOSPADM

## 2025-02-10 RX ORDER — KETOROLAC TROMETHAMINE 30 MG/ML
15 INJECTION, SOLUTION INTRAMUSCULAR; INTRAVENOUS EVERY 6 HOURS
Status: COMPLETED | OUTPATIENT
Start: 2025-02-10 | End: 2025-02-11

## 2025-02-10 RX ORDER — ONDANSETRON HYDROCHLORIDE 2 MG/ML
4 INJECTION, SOLUTION INTRAVENOUS ONCE AS NEEDED
Status: DISCONTINUED | OUTPATIENT
Start: 2025-02-10 | End: 2025-02-10 | Stop reason: HOSPADM

## 2025-02-10 RX ORDER — ACETAMINOPHEN 325 MG/1
650 TABLET ORAL ONCE
Status: COMPLETED | OUTPATIENT
Start: 2025-02-10 | End: 2025-02-10

## 2025-02-10 RX ORDER — ONDANSETRON HYDROCHLORIDE 2 MG/ML
INJECTION, SOLUTION INTRAVENOUS AS NEEDED
Status: DISCONTINUED | OUTPATIENT
Start: 2025-02-10 | End: 2025-02-10

## 2025-02-10 RX ADMIN — OXYCODONE HYDROCHLORIDE 5 MG: 5 TABLET ORAL at 16:30

## 2025-02-10 RX ADMIN — KETOROLAC TROMETHAMINE 15 MG: 30 INJECTION, SOLUTION INTRAMUSCULAR at 23:20

## 2025-02-10 RX ADMIN — ACETAMINOPHEN 650 MG: 325 TABLET ORAL at 10:32

## 2025-02-10 RX ADMIN — ONDANSETRON 4 MG: 2 INJECTION INTRAMUSCULAR; INTRAVENOUS at 19:44

## 2025-02-10 RX ADMIN — MIDAZOLAM 2 MG: 1 INJECTION INTRAMUSCULAR; INTRAVENOUS at 13:18

## 2025-02-10 RX ADMIN — CEFAZOLIN SODIUM 2 G: 2 INJECTION, SOLUTION INTRAVENOUS at 13:33

## 2025-02-10 RX ADMIN — CELECOXIB 200 MG: 200 CAPSULE ORAL at 10:32

## 2025-02-10 RX ADMIN — ASPIRIN 81 MG: 81 TABLET, COATED ORAL at 21:44

## 2025-02-10 RX ADMIN — BUPIVACAINE HYDROCHLORIDE IN DEXTROSE 1.8 ML: 7.5 INJECTION, SOLUTION SUBARACHNOID at 13:28

## 2025-02-10 RX ADMIN — GLYCOPYRROLATE 0.2 MG: 0.2 INJECTION, SOLUTION INTRAMUSCULAR; INTRAVENOUS at 16:20

## 2025-02-10 RX ADMIN — PROPOFOL 50 MG: 10 INJECTION, EMULSION INTRAVENOUS at 13:32

## 2025-02-10 RX ADMIN — FAMOTIDINE 20 MG: 10 INJECTION, SOLUTION INTRAVENOUS at 13:35

## 2025-02-10 RX ADMIN — POVIDONE-IODINE 1 APPLICATION: 5 SOLUTION TOPICAL at 10:32

## 2025-02-10 RX ADMIN — HYDROMORPHONE HYDROCHLORIDE 0.5 MG: 1 INJECTION, SOLUTION INTRAMUSCULAR; INTRAVENOUS; SUBCUTANEOUS at 18:22

## 2025-02-10 RX ADMIN — TRANEXAMIC ACID 1950 MG: 650 TABLET ORAL at 10:32

## 2025-02-10 RX ADMIN — ACETAMINOPHEN 650 MG: 325 TABLET ORAL at 17:31

## 2025-02-10 RX ADMIN — KETOROLAC TROMETHAMINE 15 MG: 30 INJECTION, SOLUTION INTRAMUSCULAR at 17:31

## 2025-02-10 RX ADMIN — HYDROCODONE BITARTRATE AND ACETAMINOPHEN 2 TABLET: 5; 325 TABLET ORAL at 21:44

## 2025-02-10 RX ADMIN — DOCUSATE SODIUM 100 MG: 100 CAPSULE, LIQUID FILLED ORAL at 21:44

## 2025-02-10 RX ADMIN — LIDOCAINE HYDROCHLORIDE 60 MG: 20 INJECTION, SOLUTION INFILTRATION; PERINEURAL at 13:32

## 2025-02-10 RX ADMIN — PROPOFOL 30 MG: 10 INJECTION, EMULSION INTRAVENOUS at 13:34

## 2025-02-10 RX ADMIN — TRANEXAMIC ACID 1950 MG: 650 TABLET ORAL at 21:44

## 2025-02-10 RX ADMIN — SODIUM CHLORIDE, POTASSIUM CHLORIDE, SODIUM LACTATE AND CALCIUM CHLORIDE 50 ML/HR: 600; 310; 30; 20 INJECTION, SOLUTION INTRAVENOUS at 10:33

## 2025-02-10 RX ADMIN — SODIUM CHLORIDE, POTASSIUM CHLORIDE, SODIUM LACTATE AND CALCIUM CHLORIDE 50 ML/HR: 600; 310; 30; 20 INJECTION, SOLUTION INTRAVENOUS at 17:40

## 2025-02-10 RX ADMIN — CEFAZOLIN SODIUM 2 G: 2 INJECTION, SOLUTION INTRAVENOUS at 21:44

## 2025-02-10 RX ADMIN — PROPOFOL 80 MCG/KG/MIN: 10 INJECTION, EMULSION INTRAVENOUS at 13:33

## 2025-02-10 RX ADMIN — Medication 2 L/MIN: at 18:22

## 2025-02-10 RX ADMIN — ONDANSETRON 4 MG: 2 INJECTION INTRAMUSCULAR; INTRAVENOUS at 13:37

## 2025-02-10 RX ADMIN — ACETAMINOPHEN 650 MG: 325 TABLET ORAL at 23:20

## 2025-02-10 SDOH — SOCIAL STABILITY: SOCIAL INSECURITY: ARE THERE ANY APPARENT SIGNS OF INJURIES/BEHAVIORS THAT COULD BE RELATED TO ABUSE/NEGLECT?: NO

## 2025-02-10 SDOH — SOCIAL STABILITY: SOCIAL INSECURITY: DO YOU FEEL ANYONE HAS EXPLOITED OR TAKEN ADVANTAGE OF YOU FINANCIALLY OR OF YOUR PERSONAL PROPERTY?: NO

## 2025-02-10 SDOH — SOCIAL STABILITY: SOCIAL INSECURITY
WITHIN THE LAST YEAR, HAVE YOU BEEN KICKED, HIT, SLAPPED, OR OTHERWISE PHYSICALLY HURT BY YOUR PARTNER OR EX-PARTNER?: NO

## 2025-02-10 SDOH — SOCIAL STABILITY: SOCIAL INSECURITY: HAVE YOU HAD ANY THOUGHTS OF HARMING ANYONE ELSE?: NO

## 2025-02-10 SDOH — ECONOMIC STABILITY: HOUSING INSECURITY: DO YOU FEEL UNSAFE GOING BACK TO THE PLACE WHERE YOU LIVE?: NO

## 2025-02-10 SDOH — ECONOMIC STABILITY: INCOME INSECURITY: IN THE PAST 12 MONTHS HAS THE ELECTRIC, GAS, OIL, OR WATER COMPANY THREATENED TO SHUT OFF SERVICES IN YOUR HOME?: NO

## 2025-02-10 SDOH — SOCIAL STABILITY: SOCIAL INSECURITY: HAVE YOU HAD THOUGHTS OF HARMING ANYONE ELSE?: NO

## 2025-02-10 SDOH — ECONOMIC STABILITY: FOOD INSECURITY: WITHIN THE PAST 12 MONTHS, THE FOOD YOU BOUGHT JUST DIDN'T LAST AND YOU DIDN'T HAVE MONEY TO GET MORE.: NEVER TRUE

## 2025-02-10 SDOH — SOCIAL STABILITY: SOCIAL INSECURITY: HAS ANYONE EVER THREATENED TO HURT YOUR FAMILY OR YOUR PETS?: NO

## 2025-02-10 SDOH — SOCIAL STABILITY: SOCIAL INSECURITY: WITHIN THE LAST YEAR, HAVE YOU BEEN AFRAID OF YOUR PARTNER OR EX-PARTNER?: NO

## 2025-02-10 SDOH — ECONOMIC STABILITY: FOOD INSECURITY: WITHIN THE PAST 12 MONTHS, YOU WORRIED THAT YOUR FOOD WOULD RUN OUT BEFORE YOU GOT THE MONEY TO BUY MORE.: NEVER TRUE

## 2025-02-10 SDOH — SOCIAL STABILITY: SOCIAL INSECURITY
WITHIN THE LAST YEAR, HAVE YOU BEEN RAPED OR FORCED TO HAVE ANY KIND OF SEXUAL ACTIVITY BY YOUR PARTNER OR EX-PARTNER?: NO

## 2025-02-10 SDOH — SOCIAL STABILITY: SOCIAL INSECURITY: WITHIN THE LAST YEAR, HAVE YOU BEEN HUMILIATED OR EMOTIONALLY ABUSED IN OTHER WAYS BY YOUR PARTNER OR EX-PARTNER?: NO

## 2025-02-10 SDOH — SOCIAL STABILITY: SOCIAL INSECURITY: ARE YOU OR HAVE YOU BEEN THREATENED OR ABUSED PHYSICALLY, EMOTIONALLY, OR SEXUALLY BY ANYONE?: NO

## 2025-02-10 SDOH — SOCIAL STABILITY: SOCIAL INSECURITY: WERE YOU ABLE TO COMPLETE ALL THE BEHAVIORAL HEALTH SCREENINGS?: YES

## 2025-02-10 SDOH — SOCIAL STABILITY: SOCIAL INSECURITY: ABUSE: ADULT

## 2025-02-10 SDOH — SOCIAL STABILITY: SOCIAL INSECURITY: DO YOU FEEL UNSAFE GOING BACK TO THE PLACE WHERE YOU ARE LIVING?: NO

## 2025-02-10 SDOH — SOCIAL STABILITY: SOCIAL INSECURITY: DOES ANYONE TRY TO KEEP YOU FROM HAVING/CONTACTING OTHER FRIENDS OR DOING THINGS OUTSIDE YOUR HOME?: NO

## 2025-02-10 SDOH — HEALTH STABILITY: MENTAL HEALTH: CURRENT SMOKER: 0

## 2025-02-10 ASSESSMENT — LIFESTYLE VARIABLES
HOW OFTEN DO YOU HAVE 6 OR MORE DRINKS ON ONE OCCASION: NEVER
AUDIT-C TOTAL SCORE: 1
HOW OFTEN DO YOU HAVE A DRINK CONTAINING ALCOHOL: MONTHLY OR LESS
HOW MANY STANDARD DRINKS CONTAINING ALCOHOL DO YOU HAVE ON A TYPICAL DAY: PATIENT DOES NOT DRINK
AUDIT-C TOTAL SCORE: 1
SKIP TO QUESTIONS 9-10: 1

## 2025-02-10 ASSESSMENT — COLUMBIA-SUICIDE SEVERITY RATING SCALE - C-SSRS
2. HAVE YOU ACTUALLY HAD ANY THOUGHTS OF KILLING YOURSELF?: NO
1. IN THE PAST MONTH, HAVE YOU WISHED YOU WERE DEAD OR WISHED YOU COULD GO TO SLEEP AND NOT WAKE UP?: NO
6. HAVE YOU EVER DONE ANYTHING, STARTED TO DO ANYTHING, OR PREPARED TO DO ANYTHING TO END YOUR LIFE?: NO

## 2025-02-10 ASSESSMENT — ACTIVITIES OF DAILY LIVING (ADL)
HEARING - LEFT EAR: FUNCTIONAL
DRESSING YOURSELF: INDEPENDENT
BATHING: INDEPENDENT
LACK_OF_TRANSPORTATION: NO
TOILETING: INDEPENDENT
ADEQUATE_TO_COMPLETE_ADL: YES
GROOMING: INDEPENDENT
FEEDING YOURSELF: INDEPENDENT
PATIENT'S MEMORY ADEQUATE TO SAFELY COMPLETE DAILY ACTIVITIES?: YES
JUDGMENT_ADEQUATE_SAFELY_COMPLETE_DAILY_ACTIVITIES: YES
HEARING - RIGHT EAR: FUNCTIONAL
WALKS IN HOME: INDEPENDENT

## 2025-02-10 ASSESSMENT — COGNITIVE AND FUNCTIONAL STATUS - GENERAL
CLIMB 3 TO 5 STEPS WITH RAILING: A LOT
DRESSING REGULAR LOWER BODY CLOTHING: A LOT
STANDING UP FROM CHAIR USING ARMS: A LOT
TURNING FROM BACK TO SIDE WHILE IN FLAT BAD: A LITTLE
PERSONAL GROOMING: A LITTLE
DAILY ACTIVITIY SCORE: 19
MOBILITY SCORE: 11
MOVING FROM LYING ON BACK TO SITTING ON SIDE OF FLAT BED WITH BEDRAILS: A LITTLE
CLIMB 3 TO 5 STEPS WITH RAILING: TOTAL
DRESSING REGULAR UPPER BODY CLOTHING: A LITTLE
STANDING UP FROM CHAIR USING ARMS: A LITTLE
HELP NEEDED FOR BATHING: A LITTLE
MOBILITY SCORE: 17
MOVING FROM LYING ON BACK TO SITTING ON SIDE OF FLAT BED WITH BEDRAILS: A LITTLE
DAILY ACTIVITIY SCORE: 17
WALKING IN HOSPITAL ROOM: A LITTLE
HELP NEEDED FOR BATHING: A LITTLE
EATING MEALS: A LITTLE
DRESSING REGULAR LOWER BODY CLOTHING: A LOT
DRESSING REGULAR UPPER BODY CLOTHING: A LITTLE
TOILETING: A LITTLE
MOVING TO AND FROM BED TO CHAIR: A LOT
MOVING TO AND FROM BED TO CHAIR: A LITTLE
TURNING FROM BACK TO SIDE WHILE IN FLAT BAD: A LOT
PATIENT BASELINE BEDBOUND: NO
TOILETING: A LITTLE
WALKING IN HOSPITAL ROOM: TOTAL

## 2025-02-10 ASSESSMENT — PATIENT HEALTH QUESTIONNAIRE - PHQ9
SUM OF ALL RESPONSES TO PHQ9 QUESTIONS 1 & 2: 0
2. FEELING DOWN, DEPRESSED OR HOPELESS: NOT AT ALL
1. LITTLE INTEREST OR PLEASURE IN DOING THINGS: NOT AT ALL

## 2025-02-10 ASSESSMENT — PAIN - FUNCTIONAL ASSESSMENT
PAIN_FUNCTIONAL_ASSESSMENT: 0-10
PAIN_FUNCTIONAL_ASSESSMENT: VAS (VISUAL ANALOG SCALE)
PAIN_FUNCTIONAL_ASSESSMENT: 0-10

## 2025-02-10 ASSESSMENT — PAIN SCALES - GENERAL
PAIN_LEVEL: 0
PAINLEVEL_OUTOF10: 0 - NO PAIN
PAINLEVEL_OUTOF10: 10 - WORST POSSIBLE PAIN
PAINLEVEL_OUTOF10: 8
PAINLEVEL_OUTOF10: 7
PAINLEVEL_OUTOF10: 0 - NO PAIN
PAINLEVEL_OUTOF10: 0 - NO PAIN
PAINLEVEL_OUTOF10: 4
PAINLEVEL_OUTOF10: 3
PAINLEVEL_OUTOF10: 4

## 2025-02-10 ASSESSMENT — PAIN DESCRIPTION - ORIENTATION: ORIENTATION: RIGHT

## 2025-02-10 ASSESSMENT — PAIN DESCRIPTION - LOCATION: LOCATION: HIP

## 2025-02-10 NOTE — DISCHARGE INSTRUCTIONS
Total Hip Replacement   Discharge Instructions    To prevent Clot formation, you have been placed on the following medication:  ASA 81 mg twice a day for 30 days started on 2/10/25  Surgical Site Care:  .Change dressing once a day and PRN (as needed). Apply 4 x 4 sponge and light tape. If glue present, leave open to air.  You may leave wound open to air after initial dressing removal, if wound is clean, dry and intact  If Aquacel Ag dressing is present, do not remove dressing for 7 days, unless heavily saturated. If heavily saturated, remove dressing and start using instructions above  Staples will be removed on post-operative day 14 and steri-strips applied  Showering is permitted starting POD1 if waterproof Aquacel dressing is present or when the incision is covered with 4 x 4 and Tegaderm waterproof dressing  Until all areas of incision are healed.    Physical Therapy:  Weight Bearing Status:  WBAT  Anterior Hip precautions, per therapy handout   Pain Medications  You were given  oxycodone  Wean off pain medications as you deem appropriate as long as pain is under control  Cold packs/Ice packs/Machine  May be used 3 times daily for 15-30 minutes as necessary  Be sure to have a barrier (cloth, clothing, towel) between the site and the ice pack to prevent frostbite  Contact [facility] office if  Increased redness, swelling, drainage of any kind, and/or pain to surgery site.  As well as new onset fevers and or chills.  These could signify an infection.  Calf or thigh tenderness to touch as well as increased swelling or redness.  This could signify a clot formation.  Numbness or tingling to an area around the incision site or below the incision site (toes).  Any rash appears, increased  or new onset nausea/vomiting occur.  This may indicate a reaction to a medication.  Phone # [phone].  Follow up with Surgeon   I acknowledge that I have received amanda hose and understand the instructions on how and  when to wear them (on during the day, off at night)   Discharging RN who has gone over instructions and acknowledges shivam hose have been received    Ice 5 times a day for 20 minutes each session to operative extremity for two weeks.   SHIVAM hose to be worn for three weeks. Can be removed for skin care and hygiene.   Incentive spirometer 10 times every hour while awake for two weeks.

## 2025-02-10 NOTE — ANESTHESIA POSTPROCEDURE EVALUATION
Patient: Lorrie Roy    Procedure Summary       Date: 02/10/25 Room / Location: [facility] / Virtual [facility]    Anesthesia Start: 1318 Anesthesia Stop:     Procedure: Arthroplasty Total Hip DIRECT  Anterior Approach (Right: Hip) Diagnosis:       Unilateral primary osteoarthritis, right hip      (Unilateral primary osteoarthritis, right hip [M16.11])    Surgeons: Rony Whitley MD Responsible Provider: Manny Lomeli DO    Anesthesia Type: MAC ASA Status: 3            Anesthesia Type: MAC    Vitals Value Taken Time   /64 02/10/25 1520   Temp 36.3 02/10/25 1522   Pulse 56 02/10/25 1520   Resp 24 02/10/25 1520   SpO2 100 02/10/25 1522   Vitals shown include unfiled device data.    Anesthesia Post Evaluation    Patient location during evaluation: PACU  Patient participation: waiting for patient participation  Level of consciousness: sleepy but conscious  Pain score: 0  Pain management: adequate  Airway patency: patent  Cardiovascular status: hemodynamically stable  Respiratory status: nonlabored ventilation, spontaneous ventilation and face mask  Hydration status: euvolemic  Postoperative Nausea and Vomiting: none        No notable events documented.

## 2025-02-10 NOTE — ANESTHESIA PREPROCEDURE EVALUATION
Patient: Lorrie Roy    Procedure Information       Date/Time: 02/10/25 1215    Procedure: Arthroplasty Total Hip DIRECT  Anterior Approach (Right: Hip) - 23 HR OBS/ BEDDED OP  adhesive tape allergy   HANA TABLE, 2ND ASSIST, ALAN, C-ARM    Location: [facility] / [facility]    Surgeons: Rony Whitley MD            Relevant Problems   Musculoskeletal   (+) Unilateral primary osteoarthritis, right hip       Clinical information reviewed:   Tobacco  Allergies  Meds  Problems  Med Hx  Surg Hx  OB Status    Fam Hx  Soc Hx        NPO Detail:  NPO/Void Status  Date of Last Liquid: 02/10/25  Time of Last Liquid: 0900  Date of Last Solid: 02/09/25  Time of Last Solid: 2000  Last Intake Type: Clear fluids  Time of Last Void: 1000         Physical Exam    Airway  Mallampati: II  TM distance: >3 FB  Neck ROM: full     Cardiovascular - normal exam     Dental    Pulmonary - normal exam     Abdominal - normal exam             Anesthesia Plan    History of general anesthesia?: yes  History of complications of general anesthesia?: no    ASA 3     MAC     The patient is not a current smoker.  Patient did not smoke on day of procedure.    intravenous induction   Anesthetic plan and risks discussed with patient.    Plan discussed with CRNA and attending.

## 2025-02-10 NOTE — ANESTHESIA PROCEDURE NOTES
Peripheral IV  Date/Time: 2/10/2025 1:35 PM  Inserted by: EVELYNE Patel    Placement  Needle size: 20 G  Laterality: right  Location: forearm  Local anesthetic: none  Site prep: alcohol  Technique: anatomical landmarks  Attempts: 1

## 2025-02-10 NOTE — CARE PLAN
Problem: Pain - Adult  Goal: Verbalizes/displays adequate comfort level or baseline comfort level  Outcome: Progressing     Problem: Safety - Adult  Goal: Free from fall injury  Outcome: Progressing     Problem: Discharge Planning  Goal: Discharge to home or other facility with appropriate resources  Outcome: Progressing     Problem: Chronic Conditions and Co-morbidities  Goal: Patient's chronic conditions and co-morbidity symptoms are monitored and maintained or improved  Outcome: Progressing     Problem: Nutrition  Goal: Nutrient intake appropriate for maintaining nutritional needs  Outcome: Progressing     Problem: Pain  Goal: Takes deep breaths with improved pain control throughout the shift  Outcome: Progressing  Goal: Turns in bed with improved pain control throughout the shift  Outcome: Progressing  Goal: Walks with improved pain control throughout the shift  Outcome: Progressing  Goal: Performs ADL's with improved pain control throughout shift  Outcome: Progressing  Goal: Participates in PT with improved pain control throughout the shift  Outcome: Progressing  Goal: Free from opioid side effects throughout the shift  Outcome: Progressing  Goal: Free from acute confusion related to pain meds throughout the shift  Outcome: Progressing     Problem: Skin  Goal: Decreased wound size/increased tissue granulation at next dressing change  Outcome: Progressing  Goal: Participates in plan/prevention/treatment measures  Outcome: Progressing  Goal: Prevent/manage excess moisture  Outcome: Progressing  Goal: Prevent/minimize sheer/friction injuries  Outcome: Progressing  Goal: Promote/optimize nutrition  Outcome: Progressing  Goal: Promote skin healing  Outcome: Progressing     Problem: Fall/Injury  Goal: Not fall by end of shift  Outcome: Progressing  Goal: Be free from injury by end of the shift  Outcome: Progressing  Goal: Verbalize understanding of personal risk factors for fall in the hospital  Outcome:  Progressing  Goal: Verbalize understanding of risk factor reduction measures to prevent injury from fall in the home  Outcome: Progressing  Goal: Use assistive devices by end of the shift  Outcome: Progressing  Goal: Pace activities to prevent fatigue by end of the shift  Outcome: Progressing   The patient's goals for the shift include      The clinical goals for the shift include Pain management

## 2025-02-10 NOTE — H&P
"History Of Present Illness  Lorrie Roy is a 49 y.o. female presenting with right hip pain secondary to osteoarthrosis.  Patient's quality of life is diminishing due to her pain and debility due to the right hip.     Past Medical History  Past Medical History:   Diagnosis Date    Anxiety     Cardiac murmur     Depression     GERD (gastroesophageal reflux disease)     Irregular heart beat     Joint pain     Snores     Urinary tract infection     Urinary tract infection     states she gets UTIs after intercourse and takes antibiotic preventatively after intercourse    Wears glasses        Surgical History  Past Surgical History:   Procedure Laterality Date    ABDOMINAL SURGERY      \"tummy tuck\"    WISDOM TOOTH EXTRACTION          Social History  She reports that she has never smoked. She has never been exposed to tobacco smoke. She has never used smokeless tobacco. She reports current alcohol use of about 1.0 standard drink of alcohol per week. She reports current drug use. Frequency: 1.00 time per week. Drug: Marijuana.    Family History  No family history on file.     Allergies  Oxycodone and Adhesive tape-silicones    Review of Systems as per HPI     Physical Exam  This is a male in no acute distress  Last Recorded Vitals  Blood pressure 158/90, pulse 65, temperature 36.2 °C (97.2 °F), temperature source Temporal, resp. rate 16, height 1.626 m (5' 4\"), weight 72.7 kg (160 lb 4.4 oz), last menstrual period 01/28/2025, SpO2 99%.  HEENT: Head is normocephalic and atraumatic  Neck: Supple and nontender  Chest and lungs: Clear to auscultation  Heart: Regular rate and rhythm  Abdomen: Positive bowel sounds, soft and nontender  Extremities:  The skin is intact about the right hip, there is no erythema or warmth with right hip flexion, internal and external rotation the patient has groin pain.      Relevant Results  Results for orders placed or performed during the hospital encounter of 02/10/25 (from the past 24 hours) "   POCT pregnancy, urine   Result Value Ref Range    Preg Test, Ur Negative Negative       Imaging: Right hip films are negative for fracture, dislocation or destructive lesion.  There is severe degenerative arthrosis of the right hip with loss of joint space and spurring     Assessment/Plan   Assessment & Plan  Unilateral primary osteoarthritis, right hip      Plan: Right total hip arthroplasty  The patient has failed to improve with multiple non-operative modalities.  There is increasing difficulty with activities of daily living and concern for falls.  The patient is endorsing severe pain and disability.       We had a lengthy discussion regarding total hip arthroplasty including the orthopaedic risks, including but not limited to, instability, infection, hematoma, early aseptic loosening, neurologic or vascular injury, clicking, limb length inequality and incomplete relief of pain.  We reviewed the medical risks, including but not limited to, deep venous thrombosis, pulmonary embolism, and cardiovascular/pulmonary issues.     We discussed the anticipated longevity of the implants and potential for revision surgery.  We also discussed anticoagulation, rehabilitation goals, and the hospital course.  We discussed the likelihood of opioid analgesics and risks associated with them.  The next step is to obtain medical risk stratification and encouraged the pre-operative information seminar at the hospital.  We are happy to provide assistance and counseling if the patient has any additional concerns.         Rony Whitley MD

## 2025-02-10 NOTE — ANESTHESIA PROCEDURE NOTES
Spinal Block    Patient location during procedure: OR  Start time: 2/10/2025 1:23 PM  End time: 2/10/2025 1:28 PM  Reason for block: primary anesthetic and at surgeon's request  Staffing  Performed: SRNA   Authorized by: Sofia Eastman MD    Performed by: EVELYNE Patel    Preanesthetic Checklist  Completed: patient identified, IV checked, site marked, risks and benefits discussed, surgical consent, monitors and equipment checked, pre-op evaluation, timeout performed and sterile techniques followed  Block Timeout  RN/Licensed healthcare professional reads aloud to the Anesthesia provider and entire team: Patient identity, procedure with side and site, patient position, and as applicable the availability of implants/special equipment/special requirements.  Patient on coagulant treatment: no  Timeout performed at: 2/10/2025 1:23 PM  Spinal Block  Patient position: sitting  Prep: Betadine  Sterility prep: cap, drape, gloves, hand hygiene and mask  Sedation level: light sedation  Patient monitoring: blood pressure, continuous pulse oximetry, EKG and heart rate  Approach: midline  Vertebral space: L3-4  Injection technique: single-shot  Needle  Needle type: Ingrid   Needle gauge: 25 G  Needle length: 3.5 in  Free flowing CSF: yes    Assessment  Sensory level: T10  Block outcome: block to be assessed in the OR  Procedure assessment: patient tolerated procedure well with no immediate complications  Additional Notes  Spinal performed by georgie bales SRNA x1 attempt.  1% lidocaine skin wheel prior to spinal placement.

## 2025-02-10 NOTE — OP NOTE
Arthroplasty Total Hip DIRECT  Anterior Approach (R) Operative Note     Date: 2/10/2025  OR Location: [facility]    Name: Lorrie Roy, : 1975, Age: 49 y.o., MRN: [id number], Sex: female    Diagnosis  Pre-op Diagnosis      * Unilateral primary osteoarthritis, right hip [M16.11] Post-op Diagnosis     * Unilateral primary osteoarthritis, right hip [M16.11]     Procedures  Arthroplasty Total Hip DIRECT  Anterior Approach  13388 - NC ARTHRP ACETBLR/PROX FEM PROSTC AGRFT/ALGRFT      Surgeons      * Rony Whitley - Primary    Resident/Fellow/Other Assistant:  Surgeons and Role:     * Ramon Meeks PA-C - Assisting    Staff:   Panfiloulator: Malina López Person: Romi    Anesthesia Staff: Anesthesiologist: Sofia Eastman MD  CRNA: LEYDI Patel-CRNA  SRNA: Dorie Shaw RN    Procedure Summary  Anesthesia: Monitor Anesthesia Care  ASA: III  Estimated Blood Loss: 50 mL  Intra-op Medications:   Administrations occurring from 1215 to 1445 on 02/10/25:   Medication Name Total Dose   sodium chloride 0.9 % irrigation solution 1,000 mL   sterile water irrigation solution 1,000 mL   bupivacaine PF 0.75 %-dextrose 8.25 % (Sensorcaine) intrathecal 1.8 mL   famotidine (Pepcid) 10 mg/mL 20 mg   lidocaine (Xylocaine) 2 % 60 mg   midazolam (Versed) 1 mg/1 mL 2 mg   ondansetron (Zofran) 2 mg/mL injection 4 mg   propofol (Diprivan) injection 10 mg/mL 498.75 mg   ceFAZolin (Ancef) 2 g in dextrose (iso)  mL 2 g              Anesthesia Record               Intraprocedure I/O Totals          Intake    ceFAZolin (Ancef) 2 g in dextrose (iso)  mL 100.00 mL    Total Intake 100 mL          Specimen: No specimens collected              Drains and/or Catheters: * None in log *    Tourniquet Times:         Implants:  Implants       Type Name Action Serial No.      Joint SHELL, TRIDENT II, CLUSTERHOLE, 54E - [id number] Implanted      Screw SCREW, LOW PROFILE HEX, 6.5 X 25 MM - [id number] Implanted      Joint INSERT, TRIDENT X3  POLYETHYLENE, 0 DEG, 36MM E - [id number] Implanted      Joint Hip STEM, PABLO, INSIGNIA, 2 HIGH - [id number] Implanted               Findings: Osteoarthrosis right hip    Indications: Lorrie Roy is an 49 y.o. female who is having surgery for Unilateral primary osteoarthritis, right hip [M16.11].     The patient was seen in the preoperative area. The risks, benefits, complications, treatment options, non-operative alternatives, expected recovery and outcomes were discussed with the patient. The possibilities of reaction to medication, pulmonary aspiration, injury to surrounding structures, bleeding, recurrent infection, the need for additional procedures, failure to diagnose a condition, and creating a complication requiring transfusion or operation were discussed with the patient. The patient concurred with the proposed plan, giving informed consent.  The site of surgery was properly noted/marked if necessary per policy. The patient has been actively warmed in preoperative area. Preoperative antibiotics have been ordered and given within 1 hours of incision. 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 The patient wishes to proceed with total hip arthroplasty. The procedure was explained along with the risks, benefits alternatives being reviewed. Potential risks including but not limited to infection, neurovascular complication, instability, loosening, wear, limb length inequality, DVT along with the potential need for reoperation and/or revision surgery were all discussed with the patient and they consented to the procedure.        Components: Sophia Insignia, Trident 2     See above implant record     Procedure:     The patient was brought to the operative suite. A spinal anesthetic was administered per  anesthesia. The patient was then placed on the Clementon table in the supine position.  The perineal post was placed and padded.  The feet were padded and placed in the traction boots.  A U drape was used to exclude the operative hip and lower extremity from the rest of the body. The hip and lower extremity was then sterilely prepped with ChloraPrep then sterilely draped in the usual manner.    A timeout was performed, the patient was then identified, the site, laterality and procedure confirmed along with the administration of the antibiotics.     I began by identifying landmarks. I made an incision 2 fingerbreadths distal and lateral to the anterior superior iliac spine on the left.  I used careful dissection through the subcutaneous and fatty tissues.  I identified the tensor fascia incised this in the line of the incision.  2 Allis Clamps were placed on the medial portion of the tensor fascia.  I then bluntly dissected the musculature off the fascia.  Identified the plane between the sartorius and the tensor.  I placed a Hohmann retractor then over the superior neck of the femur.  Next identified the lateral circumflex vessels.  These were isolated and cauterized.  I then placed a retractor over the inferior neck.  I then debrided some of the pericapsular fat.  Next I elevated the rectus off of the capsule.  A retractor was placed anteriorly and superiorly over the acetabulum.  I then performed a capsulotomy.  I debrided the capsule and was able to identify the femoral head.  I marked the length of my neck cut of what I had templated to.  I placed the femur in 30 degrees of external rotation and applied  some traction.  I then made my neck cut.   I remove the femoral head then.  Retractors were placed around the acetabulum.  I debrided the labrum and capsule.  I began reaming then with a 48 mm reamer.  I reamed up to 54 mm.  When I reamed with the 54 mm reamer I brought in C arm.  Using the standing AP preoperative  pelvis x-ray I matched this with the C arm.  I then centered the C arm over the cup.  I completed my reaming and about 40 to 45 degrees of horizontal abduction and 15 degrees of anteversion.  I was satisfied with the position in relation to the teardrop.  At that point I removed the reamer.  I thoroughly and copiously irrigated with normal saline.  Next with the aid of C arm I impacted the permanent cup in 40 to 45 degrees of horizontal abduction and 15 degrees of anteversion.  I checked the cup to be sure it was seated.  Next I drilled and placed a single screw.  I then impacted the liner.  I checked this to be sure it was seated.  I then debrided any osteophytes with a rongeur.     I turned my attention then to the femur.  I placed the McGraw hook around the intertrochanteric region.  I then dropped the left leg to the floor externally rotated only to 120 degrees and adducted the leg.  I then used the lift.  I placed a retractor over the lesser trochanter as well as the greater trochanter.  I performed my soft tissue releases around the femur releasing the capsule as well as the conjoined tendon.  Next I used a box osteotome.  I then used a rat tail rasp to find the canal.  I began broaching then with the chili pepper broach.  I broached sequentially up to a size 2.      I began trialing then off the femoral broach with various neck lengths.  Was taken through range of motion to assure stability.  I also used C arm to check my leg lengths.  Once I was able to achieve a stable construct I redislocated the hip. The broach was removed. I then impacted the stem matching the anteversion that I had broached to.  Then impacted the permanent size 2 high offset stem.  I then cleaned off the herrera taper. The head was then impacted onto the stem. I checked to be sure the head was seated. The hip was reduced. I again took the hip through range of motion and was satisfied with my stability.  I checked C arm and was satisfied  with the construct as well as my limb lengths.  I then infiltrated the soft tissues with a mixture of ropivacaine with epinephrine, clonidine and Toradol.     At that point I thoroughly copiously irrigated with normal saline. Attention was turned to closure.  The tensor fascia was repaired with #1 Vicryl.  The deep fatty tissue was repaired with 0 Vicryl. Subcutaneous tissue closure was with 2-0 Vicryl. The skin was closed with Monocryl and Dermabond. A sterile waterproof dressing was applied.    The physician assistant was present for the entire case.  Given the nature of the procedure and disease process a skilled surgical assistant was necessary for the case.  The assistant was necessary for retraction and helped directly facilitate completion of the surgery.  A certified scrub tech was at the back table managing instruments and supplies for the surgical procedure.    Complications:  None; patient tolerated the procedure well.    Disposition: PACU - hemodynamically stable.  Condition: stable                 Additional Details: Not applicable    Attending Attestation: I performed the procedure.    Rony Whitley  Phone Number: [phone]

## 2025-02-11 ENCOUNTER — PHARMACY VISIT (OUTPATIENT)
Dept: PHARMACY | Facility: CLINIC | Age: 50
End: 2025-02-11
Payer: COMMERCIAL

## 2025-02-11 VITALS
HEIGHT: 64 IN | DIASTOLIC BLOOD PRESSURE: 79 MMHG | BODY MASS INDEX: 27.36 KG/M2 | RESPIRATION RATE: 16 BRPM | OXYGEN SATURATION: 95 % | WEIGHT: 160.27 LBS | HEART RATE: 56 BPM | TEMPERATURE: 98.6 F | SYSTOLIC BLOOD PRESSURE: 169 MMHG

## 2025-02-11 PROBLEM — M16.11 PRIMARY LOCALIZED OSTEOARTHRITIS OF RIGHT HIP: Status: RESOLVED | Noted: 2025-02-10 | Resolved: 2025-02-11

## 2025-02-11 LAB
ANION GAP SERPL CALC-SCNC: 9 MMOL/L (ref 10–20)
BUN SERPL-MCNC: 13 MG/DL (ref 6–23)
CALCIUM SERPL-MCNC: 8.5 MG/DL (ref 8.6–10.3)
CHLORIDE SERPL-SCNC: 106 MMOL/L (ref 98–107)
CO2 SERPL-SCNC: 24 MMOL/L (ref 21–32)
CREAT SERPL-MCNC: 0.83 MG/DL (ref 0.5–1.05)
EGFRCR SERPLBLD CKD-EPI 2021: 85 ML/MIN/1.73M*2
ERYTHROCYTE [DISTWIDTH] IN BLOOD BY AUTOMATED COUNT: 11.9 % (ref 11.5–14.5)
GLUCOSE SERPL-MCNC: 144 MG/DL (ref 74–99)
HCT VFR BLD AUTO: 32.8 % (ref 36–46)
HGB BLD-MCNC: 11.4 G/DL (ref 12–16)
HOLD SPECIMEN: NORMAL
MCH RBC QN AUTO: 31.9 PG (ref 26–34)
MCHC RBC AUTO-ENTMCNC: 34.8 G/DL (ref 32–36)
MCV RBC AUTO: 92 FL (ref 80–100)
NRBC BLD-RTO: 0 /100 WBCS (ref 0–0)
PLATELET # BLD AUTO: 242 X10*3/UL (ref 150–450)
POTASSIUM SERPL-SCNC: 4.5 MMOL/L (ref 3.5–5.3)
RBC # BLD AUTO: 3.57 X10*6/UL (ref 4–5.2)
SODIUM SERPL-SCNC: 134 MMOL/L (ref 136–145)
WBC # BLD AUTO: 13.9 X10*3/UL (ref 4.4–11.3)

## 2025-02-11 PROCEDURE — 2500000001 HC RX 250 WO HCPCS SELF ADMINISTERED DRUGS (ALT 637 FOR MEDICARE OP)

## 2025-02-11 PROCEDURE — 36415 COLL VENOUS BLD VENIPUNCTURE: CPT | Performed by: ORTHOPAEDIC SURGERY

## 2025-02-11 PROCEDURE — 97110 THERAPEUTIC EXERCISES: CPT | Mod: GP,CQ

## 2025-02-11 PROCEDURE — 2500000002 HC RX 250 W HCPCS SELF ADMINISTERED DRUGS (ALT 637 FOR MEDICARE OP, ALT 636 FOR OP/ED): Performed by: ORTHOPAEDIC SURGERY

## 2025-02-11 PROCEDURE — RXMED WILLOW AMBULATORY MEDICATION CHARGE

## 2025-02-11 PROCEDURE — 97161 PT EVAL LOW COMPLEX 20 MIN: CPT | Mod: GP | Performed by: PHYSICAL THERAPIST

## 2025-02-11 PROCEDURE — 99239 HOSP IP/OBS DSCHRG MGMT >30: CPT

## 2025-02-11 PROCEDURE — 85027 COMPLETE CBC AUTOMATED: CPT | Performed by: ORTHOPAEDIC SURGERY

## 2025-02-11 PROCEDURE — 7100000011 HC EXTENDED STAY RECOVERY HOURLY - NURSING UNIT

## 2025-02-11 PROCEDURE — 2500000004 HC RX 250 GENERAL PHARMACY W/ HCPCS (ALT 636 FOR OP/ED): Performed by: ORTHOPAEDIC SURGERY

## 2025-02-11 PROCEDURE — 97165 OT EVAL LOW COMPLEX 30 MIN: CPT | Mod: GO

## 2025-02-11 PROCEDURE — 97535 SELF CARE MNGMENT TRAINING: CPT | Mod: GO

## 2025-02-11 PROCEDURE — 97530 THERAPEUTIC ACTIVITIES: CPT | Mod: GP,CQ

## 2025-02-11 PROCEDURE — 80048 BASIC METABOLIC PNL TOTAL CA: CPT | Performed by: ORTHOPAEDIC SURGERY

## 2025-02-11 PROCEDURE — 2500000001 HC RX 250 WO HCPCS SELF ADMINISTERED DRUGS (ALT 637 FOR MEDICARE OP): Performed by: ORTHOPAEDIC SURGERY

## 2025-02-11 PROCEDURE — 97116 GAIT TRAINING THERAPY: CPT | Mod: GP,CQ

## 2025-02-11 RX ORDER — BUPROPION HYDROCHLORIDE 150 MG/1
300 TABLET ORAL
Status: DISCONTINUED | OUTPATIENT
Start: 2025-02-11 | End: 2025-02-11 | Stop reason: HOSPADM

## 2025-02-11 RX ORDER — ACETAMINOPHEN 325 MG/1
650 TABLET ORAL EVERY 6 HOURS SCHEDULED
Qty: 56 TABLET | Refills: 0 | Status: SHIPPED | OUTPATIENT
Start: 2025-02-11 | End: 2025-02-18

## 2025-02-11 RX ORDER — CYCLOBENZAPRINE HCL 10 MG
10 TABLET ORAL 3 TIMES DAILY PRN
Qty: 21 TABLET | Refills: 0 | Status: SHIPPED | OUTPATIENT
Start: 2025-02-11 | End: 2025-02-18

## 2025-02-11 RX ORDER — ONDANSETRON 4 MG/1
4 TABLET, FILM COATED ORAL EVERY 8 HOURS PRN
Qty: 21 TABLET | Refills: 0 | Status: SHIPPED | OUTPATIENT
Start: 2025-02-11 | End: 2025-02-18

## 2025-02-11 RX ORDER — ASPIRIN 81 MG/1
81 TABLET ORAL 2 TIMES DAILY
Qty: 60 TABLET | Refills: 0 | Status: SHIPPED | OUTPATIENT
Start: 2025-02-11 | End: 2025-03-13

## 2025-02-11 RX ORDER — DOCUSATE SODIUM 100 MG/1
100 CAPSULE, LIQUID FILLED ORAL 2 TIMES DAILY
Qty: 20 CAPSULE | Refills: 0 | Status: SHIPPED | OUTPATIENT
Start: 2025-02-11 | End: 2025-02-21

## 2025-02-11 RX ORDER — ESCITALOPRAM OXALATE 10 MG/1
10 TABLET ORAL
Status: DISCONTINUED | OUTPATIENT
Start: 2025-02-11 | End: 2025-02-11 | Stop reason: HOSPADM

## 2025-02-11 RX ORDER — HYDROCODONE BITARTRATE AND ACETAMINOPHEN 5; 325 MG/1; MG/1
1 TABLET ORAL EVERY 6 HOURS PRN
Qty: 28 TABLET | Refills: 0 | Status: SHIPPED | OUTPATIENT
Start: 2025-02-11 | End: 2025-02-18

## 2025-02-11 RX ADMIN — KETOROLAC TROMETHAMINE 15 MG: 30 INJECTION, SOLUTION INTRAMUSCULAR at 05:54

## 2025-02-11 RX ADMIN — ESCITALOPRAM OXALATE 10 MG: 10 TABLET, FILM COATED ORAL at 07:49

## 2025-02-11 RX ADMIN — BUPROPION HYDROCHLORIDE 300 MG: 150 TABLET, EXTENDED RELEASE ORAL at 07:49

## 2025-02-11 RX ADMIN — TRANEXAMIC ACID 1950 MG: 650 TABLET ORAL at 05:53

## 2025-02-11 RX ADMIN — ACETAMINOPHEN 650 MG: 325 TABLET ORAL at 11:21

## 2025-02-11 RX ADMIN — PROCHLORPERAZINE MALEATE 10 MG: 5 TABLET ORAL at 08:38

## 2025-02-11 RX ADMIN — ONDANSETRON 4 MG: 4 TABLET, ORALLY DISINTEGRATING ORAL at 04:01

## 2025-02-11 RX ADMIN — CEFAZOLIN SODIUM 2 G: 2 INJECTION, SOLUTION INTRAVENOUS at 06:25

## 2025-02-11 RX ADMIN — KETOROLAC TROMETHAMINE 15 MG: 30 INJECTION, SOLUTION INTRAMUSCULAR at 11:21

## 2025-02-11 RX ADMIN — ACETAMINOPHEN 650 MG: 325 TABLET ORAL at 05:53

## 2025-02-11 RX ADMIN — HYDROCODONE BITARTRATE AND ACETAMINOPHEN 2 TABLET: 5; 325 TABLET ORAL at 04:01

## 2025-02-11 RX ADMIN — DOCUSATE SODIUM 100 MG: 100 CAPSULE, LIQUID FILLED ORAL at 08:38

## 2025-02-11 RX ADMIN — PROCHLORPERAZINE EDISYLATE 10 MG: 5 INJECTION INTRAMUSCULAR; INTRAVENOUS at 02:09

## 2025-02-11 RX ADMIN — ASPIRIN 81 MG: 81 TABLET, COATED ORAL at 08:38

## 2025-02-11 ASSESSMENT — PAIN SCALES - GENERAL
PAINLEVEL_OUTOF10: 3
PAINLEVEL_OUTOF10: 1
PAINLEVEL_OUTOF10: 7
PAINLEVEL_OUTOF10: 1
PAINLEVEL_OUTOF10: 3

## 2025-02-11 ASSESSMENT — COGNITIVE AND FUNCTIONAL STATUS - GENERAL
TURNING FROM BACK TO SIDE WHILE IN FLAT BAD: A LITTLE
WALKING IN HOSPITAL ROOM: A LITTLE
CLIMB 3 TO 5 STEPS WITH RAILING: A LITTLE
MOVING TO AND FROM BED TO CHAIR: A LITTLE
HELP NEEDED FOR BATHING: A LITTLE
MOVING FROM LYING ON BACK TO SITTING ON SIDE OF FLAT BED WITH BEDRAILS: A LITTLE
DAILY ACTIVITIY SCORE: 23
MOVING FROM LYING ON BACK TO SITTING ON SIDE OF FLAT BED WITH BEDRAILS: A LITTLE
CLIMB 3 TO 5 STEPS WITH RAILING: A LOT
TURNING FROM BACK TO SIDE WHILE IN FLAT BAD: A LITTLE
MOBILITY SCORE: 18
STANDING UP FROM CHAIR USING ARMS: A LITTLE
STANDING UP FROM CHAIR USING ARMS: A LITTLE
DRESSING REGULAR LOWER BODY CLOTHING: A LOT
MOBILITY SCORE: 17
TOILETING: A LITTLE
EATING MEALS: A LITTLE
WALKING IN HOSPITAL ROOM: A LITTLE
PERSONAL GROOMING: A LITTLE
HELP NEEDED FOR BATHING: A LITTLE
MOBILITY SCORE: 19
DRESSING REGULAR UPPER BODY CLOTHING: A LITTLE
MOVING TO AND FROM BED TO CHAIR: A LITTLE
MOVING FROM LYING ON BACK TO SITTING ON SIDE OF FLAT BED WITH BEDRAILS: A LITTLE
STANDING UP FROM CHAIR USING ARMS: A LITTLE
CLIMB 3 TO 5 STEPS WITH RAILING: A LITTLE
DAILY ACTIVITIY SCORE: 17
WALKING IN HOSPITAL ROOM: A LITTLE
MOVING TO AND FROM BED TO CHAIR: A LITTLE

## 2025-02-11 ASSESSMENT — PAIN - FUNCTIONAL ASSESSMENT
PAIN_FUNCTIONAL_ASSESSMENT: 0-10

## 2025-02-11 ASSESSMENT — ACTIVITIES OF DAILY LIVING (ADL)
HOME_MANAGEMENT_TIME_ENTRY: 8
BATHING_ASSISTANCE: STAND BY
LACK_OF_TRANSPORTATION: NO

## 2025-02-11 NOTE — PROGRESS NOTES
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 Pt owns FWW, reacher, walk-in shower with seat, toilet riser   Type of Residence Private residence  (2 level home, bedroom/full bathroom upstairs, 1/2 bath on 1st floor)   Number of Stairs to Enter Residence 3  (with handrail)   Number of Stairs Within Residence 13   Do you have animals or pets at home? Yes   Type of Animals or Pets 2 Cats   Home or Post Acute Services In home services   Type of Home Care Services Home OT;Home PT   Expected Discharge Disposition Home H   Does the patient need discharge transport arranged? No   Financial Resource Strain   How hard is it for you to pay for the very basics like food, housing, medical care, and heating? Not hard   Housing Stability   In the last 12 months, was there a time when you were not able to pay the mortgage or rent on time? N   In the past 12 months, how many times have you moved where you were living? 0   At any time in the past 12 months, were you homeless or living in a shelter (including now)? N   Transportation Needs   In the past 12 months, has lack of transportation kept you from medical appointments or from getting medications? no   In the past 12 months, has lack of transportation kept you from meetings, work, or from getting things needed for daily living? No   Patient Choice   Provider Choice list and CMS website (https://medicare.gov/care-compare#search) for post-acute Quality and Resource Measure Data were provided and reviewed with: Patient   Patient / Family choosing to utilize agency / facility established prior to hospitalization No   Stroke Family Assessment   Stroke Family Assessment Needed No   Intensity of Service   Intensity of Service 0-30 min     Pt is s/p POD #1  Elective right total hip arthroplasty 2/10/25 with Dr. Rony Whitley. Pt is weight bearing as tolerated with anterior hip precautions to operative extremity. Pt will DC on Aspirin 81 mg PO BID x 30 days for DVT prophylaxis. [facility] scores are PT (18) OT (23) recommending continued therapy HHC vs OP. Pt discharge preference is home with [facility] and AOC is #1 [facility], #2 [facility]. Demographics verified. CNP notified of need for External HCO. Referral built and sent to [facility], confirms able to accept, notify that pt has 20% co-pay which pt is agreeable to. [facility] notified and pt accepted for next day SOC.  Weight bearing as tolerated with anterior hip precautions to operative extremity

## 2025-02-11 NOTE — PROGRESS NOTES
Physical Therapy    Physical Therapy    Physical Therapy Treatment    Patient Name: Lorrie Ryo  MRN: [id number]  Today's Date: 2/11/2025  Time Calculation  Start Time: 1130  Stop Time: 1215  Time Calculation (min): 45 min       Assessment/Plan   PT Assessment  PT Assessment Results: Decreased strength, Impaired balance, Decreased mobility, Decreased safety awareness  Rehab Prognosis: Good  Barriers to Discharge Home: No anticipated barriers  Evaluation/Treatment Tolerance:  (Treatment limited by dizziness/nausea. Patien had been medicated throughout night for similar sympttoms)  End of Session Communication: Bedside nurse  Assessment Comment: Patient will benefit from additional PT to increase strength, mobility, and activity tolerance. Educaete in anterior hip precautions and HEP. Progress ambulation  End of Session Patient Position: Up in chair, Alarm off, not on at start of session     PT Plan  Treatment/Interventions: Bed mobility, Transfer training, Gait training, Stair training, Strengthening, Therapeutic exercise, Therapeutic activity, Home exercise program  PT Frequency: BID  PT Discharge Recommendations:  (HHPT v OPPT)  PT Recommended Transfer Status: Assist x1      Current Problem:  1. Unilateral primary osteoarthritis, right hip  Referral to Home Health    acetaminophen (Tylenol) 325 mg tablet    aspirin 81 mg EC tablet    cyclobenzaprine (Flexeril) 10 mg tablet    docusate sodium (Colace) 100 mg capsule    HYDROcodone-acetaminophen (Norco) 5-325 mg tablet    ondansetron (Zofran) 4 mg tablet          General Visit Information:   PT  Visit  PT Received On: 02/11/25  General  Reason for Referral: R THR  Family/Caregiver Present: Yes  Caregiver Feedback:   Prior to Session Communication: Bedside nurse  Patient Position Received: Up in chair, Alarm off, not on at start of session  Preferred Learning Style: verbal, written  General Comment:  (Pt sitting up in recliner- feels much better now- less  nausea.)  Subjective     Precautions:  Precautions  LE Weight Bearing Status: Weight Bearing as Tolerated  Medical Precautions: Fall precautions  Post-Surgical Precautions: Right hip precautions  Precautions Comment:  (Discussed/ reviewed anterior hip precautions.)         Objective     Pain:  Pain Assessment  Pain Assessment: 0-10  0-10 (Numeric) Pain Score: 3  Pain Type: Acute pain, Surgical pain  Pain Location: Hip  Pain Orientation: Right  Pain Interventions: Cold applied                        Treatments:  Therapeutic Exercise  Therapeutic Exercise Performed: Yes  Therapeutic Exercise Activity 1: Pt performed supine ther ex including ankle pumps, quad sets, glut sets, heel slides, AAROM hip ABD, SAQ 10 reps.  Therapeutic Exercise Activity 2: Pt performed seated ther ex  including LAQ, ABD/ADD pillow squeeze and Heel/toe raises 10 reps. Issued HEP for discharge. Verbal and tactile cues for technique and breathing.        Bed Mobility  Bed Mobility: Yes  Bed Mobility 1  Bed Mobility 1: Supine to sitting  Level of Assistance 1: Modified independent  Bed Mobility 2  Bed Mobility  2: Sitting to supine  Level of Assistance 2: Modified independent  Bed Mobility Comments 2:  (Pt performed bed mobility with mod independence with v/c for proper technique adhering to anterior hip precautions.)  Ambulation/Gait Training  Ambulation/Gait Training Performed: Yes  Ambulation/Gait Training 1  Surface 1: Level tile  Device 1: Rolling walker  Assistance 1: Close supervision  Quality of Gait 1: Inconsistent stride length, Decreased step length  Comments/Distance (ft) 1:  (Pt ambulates with WW  WBAT with slow uneven strides with initial step to gait with NBOS.  V/c to heel-to-toe strike with increased stride to establish a step through gait pattern.  Distance tolu 75 ftx2 with close supervision.)  Transfers  Transfer: Yes  Transfer 1  Technique 1: Sit to stand, Stand to sit  Transfer Device 1: Walker  Transfer Level of Assistance  1: Close supervision  Trials/Comments 1:  (Pt transfers with WW  WBAT with supervision.)  Stairs  Stairs: Yes  Stairs  Rails 1: Right  Device 1: Single point cane  Assistance 1: Close supervision  Comment/Number of Steps 1:  (Pt performs stair climbing with 1 HR and SPC with supervision with v/c for proper technique/ sequence.)       Outcome Measures:  [facility] Basic Mobility  Turning from your back to your side while in a flat bed without using bedrails: A little  Moving from lying on your back to sitting on the side of a flat bed without using bedrails: None  Moving to and from bed to chair (including a wheelchair): A little  Standing up from a chair using your arms (e.g. wheelchair or bedside chair): A little  To walk in hospital room: A little  Climbing 3-5 steps with railing: A little  Basic Mobility - Total Score: 19  Education Documentation  No documentation found.  Education Comments  No comments found.        EDUCATION:    Individual(s) Educated: Patient, Spouse  Education Provided: Body Mechanics, Fall Risk, Home Exercise Program, Home Safety, Post-Op Precautions  Education Comment:  (Pt issued written HEP and anterior hip precautions.)  Encounter Problems       Encounter Problems (Active)       PT Problem       Bed mobility supine <> sit independent  (Met)       Start:  02/11/25    Expected End:  02/25/25    Resolved:  02/11/25         Transfers sit <> stand with ww modified independent  (Progressing)       Start:  02/11/25    Expected End:  02/25/25            Patient to ambulate with ww 100' modified independent  (Progressing)       Start:  02/11/25    Expected End:  02/25/25            Patient to negotiate 10 steps with handrail and cane CGA (Progressing)       Start:  02/11/25    Expected End:  02/25/25            Patient independent in HEP  (Progressing)       Start:  02/11/25    Expected End:  02/25/25

## 2025-02-11 NOTE — CARE PLAN
The patient's goals for the shift include  control nausea and vomiting     The clinical goals for the shift include Pain management    Over the shift, the patient made progress toward the following goals.       Problem: Pain - Adult  Goal: Verbalizes/displays adequate comfort level or baseline comfort level  Outcome: Progressing     Problem: Safety - Adult  Goal: Free from fall injury  Outcome: Progressing     Problem: Discharge Planning  Goal: Discharge to home or other facility with appropriate resources  Outcome: Progressing     Problem: Chronic Conditions and Co-morbidities  Goal: Patient's chronic conditions and co-morbidity symptoms are monitored and maintained or improved  Outcome: Progressing     Problem: Nutrition  Goal: Nutrient intake appropriate for maintaining nutritional needs  Outcome: Progressing     Problem: Pain  Goal: Takes deep breaths with improved pain control throughout the shift  Outcome: Progressing  Goal: Turns in bed with improved pain control throughout the shift  Outcome: Progressing  Goal: Walks with improved pain control throughout the shift  Outcome: Progressing  Goal: Performs ADL's with improved pain control throughout shift  Outcome: Progressing  Goal: Participates in PT with improved pain control throughout the shift  Outcome: Progressing  Goal: Free from opioid side effects throughout the shift  Outcome: Progressing  Goal: Free from acute confusion related to pain meds throughout the shift  Outcome: Progressing     Problem: Skin  Goal: Decreased wound size/increased tissue granulation at next dressing change  Outcome: Progressing  Flowsheets (Taken 2/10/2025 2244)  Decreased wound size/increased tissue granulation at next dressing change: Promote sleep for wound healing  Goal: Participates in plan/prevention/treatment measures  Outcome: Progressing  Goal: Prevent/manage excess moisture  Outcome: Progressing  Goal: Prevent/minimize sheer/friction injuries  Outcome:  Progressing  Goal: Promote/optimize nutrition  Outcome: Progressing  Goal: Promote skin healing  Outcome: Progressing     Problem: Fall/Injury  Goal: Not fall by end of shift  Outcome: Progressing  Goal: Be free from injury by end of the shift  Outcome: Progressing  Goal: Verbalize understanding of personal risk factors for fall in the hospital  Outcome: Progressing  Goal: Verbalize understanding of risk factor reduction measures to prevent injury from fall in the home  Outcome: Progressing  Goal: Use assistive devices by end of the shift  Outcome: Progressing  Goal: Pace activities to prevent fatigue by end of the shift  Outcome: Progressing

## 2025-02-11 NOTE — NURSING NOTE
AVS printed and reviewed with patient. All belonging sent with patient. New medication discussed with patient. Meds to bed arrived. IV x 2 removed caths intact 0 redness at site. Pt states has all belongings.

## 2025-02-11 NOTE — PROGRESS NOTES
Physical Therapy    Physical Therapy    Physical Therapy Evaluation    Patient Name: Lorrie Roy  MRN: [id number]  Today's Date: 2/11/2025   Time Calculation  Start Time: 0650  Stop Time: 0710  Time Calculation (min): 20 min  608/608-A    Assessment/Plan   PT Assessment  PT Assessment Results: Decreased strength, Impaired balance, Decreased mobility, Decreased safety awareness  Rehab Prognosis: Good  Barriers to Discharge Home: No anticipated barriers  Evaluation/Treatment Tolerance:  (Treatment limited by dizziness/nausea. Patien had been medicated throughout night for similar sympttoms)  End of Session Communication: Bedside nurse  Assessment Comment: Patient will benefit from additional PT to increase strength, mobility, and activity tolerance. Educaete in anterior hip precautions and HEP. Progress ambulation  End of Session Patient Position: Up in chair, Alarm off, not on at start of session (LEs elevated)  IP OR SWING BED PT PLAN  Inpatient or Swing Bed: Inpatient  PT Plan  Treatment/Interventions: Bed mobility, Transfer training, Gait training, Stair training, Strengthening, Therapeutic exercise, Therapeutic activity, Home exercise program  PT Frequency: BID  PT Discharge Recommendations:  (HHPT v OPPT)  PT Recommended Transfer Status: Assist x1  PT - OK to Discharge: (once deemed medically appropriate with continued PT)    Subjective     General Visit Information:  General  Reason for Referral: s/p Right PAXTON 2/10/25 Angi  Referred By: PT/OT 2/10/25 Angi WBAT ANTERIOR HIP PRECAUTIONS  Past Medical History Relevant to Rehab: Depression, Anxiety, GERD, Murmur, Marijuana  Patient Position Received: Bed, 3 rail up, Alarm off, not on at start of session  General Comment: Elective surgery    Home Living:  Home Living  Home Living Comments: Per patient report resides with spouse and 2 adult sons (1 currently away at college) 2 story home bed/bath 2nd floor wth handrail. 1/2 bath on 1st floor. Owns ww. Walk in shower  with built in seat; Raised toilet ih grab bars. Owns reacher    Prior Level of Function:  Prior Function Per Pt/Caregiver Report  Prior Function Comments: Per patient reporto independent in mobility, ADLs and IADLs without assistive device. Denies any falls. Drives. Works as district  for local school system.    Precautions:  Precautions  LE Weight Bearing Status:  (WBAT RLE)  ANTERIOR HIP PRECAUTIONS       Objective     Pain:  Pain Assessment  0-10 (Numeric) Pain Score: 3  Pain Type: Surgical pain  Pain Location: Hip  Pain Orientation: Right    Cognition:  Cognition  Overall Cognitive Status: Within Functional Limits    General Assessments:  General Observation  General Observation: Patient lying in bed. Note pillow under RLE. Educated patient in not propping RLE up. Patient voiced understanding. Agreeablle to PT.   Activity Tolerance  Endurance:  (Fair)                 Static Sitting Balance: good  Dynamic Sitting Balance good  Static Standing Balance: fair +  Dynamic Standing Balance: Fair +    Functional Assessments:     Bed Mobility  Bed Mobility:  (Supine > sit with hOB 50 degrees modified independent)  Transfers  Transfer:  (Sit > stand at bed level CGA vc/tc for hand position.Stand > sit at recliner vc to extend RLE out and reach back CGA. Patient c/o dizziness, note pallor. LEs elevated and encouraged patient to pump ankles. Ice pack to neck and cold wash cloth to face.)  Noted imroved color after 3 minutes. RN informed   Ambulation/Gait Training  Ambulation/Gait Training Performed:  (Patient ambulated with ww 20' CGA note quick bhupinder, increased as walking secondary to increase sensation of lightheadedness and dizziness. Patient will benefit from further education on proper sequencing and control.)          Extremity/Trunk Assessments:  RUE   RUE : Within Functional Limits (MMT shoulder/elebow/ 5/5)  LUE   LUE: Within Functional Limits (MMT shoulder/elebow/ 5/5)  RLE   RLE :  (AROM  Hip/knee/ankle WFL MMT ankle dorsiflexors 5/5)  LLE   LLE :  (AROM Hip/knee/ankle WFL MMT hip5/5, knee 5/5, ankle 5/5)    Outcome Measures:     [facility] Basic Mobility  Turning from your back to your side while in a flat bed without using bedrails: A little  Moving from lying on your back to sitting on the side of a flat bed without using bedrails: A little  Moving to and from bed to chair (including a wheelchair): A little  Standing up from a chair using your arms (e.g. wheelchair or bedside chair): A little  To walk in hospital room: A little  Climbing 3-5 steps with railing: A little  Basic Mobility - Total Score: 18                 Goals:  Encounter Problems       Encounter Problems (Active)       PT Problem       Bed mobility supine <> sit independent  (Progressing)       Start:  02/11/25    Expected End:  02/25/25            Transfers sit <> stand with ww modified independent  (Progressing)       Start:  02/11/25    Expected End:  02/25/25            Patient to ambulate with ww 100' modified independent  (Progressing)       Start:  02/11/25    Expected End:  02/25/25            Patient to negotiate 10 steps with handrail and cane CGA (Not Progressing)       Start:  02/11/25    Expected End:  02/25/25            Patient independent in HEP  (Not Progressing)       Start:  02/11/25    Expected End:  02/25/25                 Education Documentation  Precautions, taught by Ysabel Stock, PT at 2/11/2025  7:53 AM.  Learner: Patient  Readiness: Acceptance  Method: Explanation, Demonstration  Response: Demonstrated Understanding    Mobility Training, taught by Ysabel Stock, PT at 2/11/2025  7:53 AM.  Learner: Patient  Readiness: Acceptance  Method: Explanation, Demonstration  Response: Demonstrated Understanding

## 2025-02-11 NOTE — PROGRESS NOTES
Occupational Therapy    Evaluation    Patient Name: Lorrie Roy  MRN: [id number]  Today's Date: 2/11/2025  Time Calculation  Start Time: 0743  Stop Time: 0759  Time Calculation (min): 16 min  608/608-A    Assessment  IP OT Assessment  OT Assessment: Pt is SBA <> IND with all ADLs and does not qualify for acute OT at this time.  Prognosis: Excellent  Barriers to Discharge Home: No anticipated barriers  Evaluation/Treatment Tolerance: Patient tolerated treatment well  Medical Staff Made Aware: Yes  End of Session Communication: Bedside nurse  End of Session Patient Position: Up in chair, Alarm off, not on at start of session (BLE elevated)    Plan:  No Skilled OT: No acute OT goals identified  OT Frequency: OT eval only  OT Discharge Recommendations: No further acute OT  OT - OK to Discharge: Yes (ok to d/c to next level of care once medically cleared)    Subjective     Current Problem:  1. Unilateral primary osteoarthritis, right hip  Referral to Home Health    acetaminophen (Tylenol) 325 mg tablet    aspirin 81 mg EC tablet    cyclobenzaprine (Flexeril) 10 mg tablet    docusate sodium (Colace) 100 mg capsule    HYDROcodone-acetaminophen (Norco) 5-325 mg tablet    ondansetron (Zofran) 4 mg tablet          General:  General  Reason for Referral: s/p Right PAXTON 2/10/25 Angi  Referred By: PT/OT 2/10/25 Angi WBAT ANTERIOR HIP PRECAUTIONS  Past Medical History Relevant to Rehab: Depression, Anxiety, GERD, Murmur, Marijuana  Family/Caregiver Present: No  Prior to Session Communication: Bedside nurse  Patient Position Received: Up in chair, Alarm off, not on at start of session  General Comment: Pt is a 50 y/o F s/p elective R PAXTON by Dr. Whitley on 2/10.    Precautions:  LE Weight Bearing Status:  (WBAT RLE)  Post-Surgical Precautions: Right hip precautions (Anterior hip precautions)    Pain:  Pain Assessment  Pain Assessment: 0-10  0-10 (Numeric) Pain Score: 3  Pain Type: Surgical pain  Pain Location: Hip  Pain Orientation:  Right    Objective     Cognition:  Overall Cognitive Status: Within Functional Limits  Orientation Level: Oriented X4             Home Living:  Home Living Comments: Pt reports living with spouse and 2 adult sons (1 currently away at college), 2 story home with bed/bath on 2nd floor with HR. 1/2 bath on first floor. Owns WW and reacher. Has a walk in shower with built in seat. Raised toilet seat with grab bars.     Prior Function:  Prior Function Comments: Pt reports IND with all ADLs, iADLs, and mobility without AD. Denies any falls. Drives. Works as district  for local school system.    ADL:  Eating Assistance: Independent  Grooming Assistance: Modified independent (Device)  Bathing Assistance: Stand by  UE Dressing Assistance: Independent (Pt donned shirt while seated IND.)  LE Dressing Assistance: Modified independent (Device) (Pt threaded BLE in underwear and shorts while seated in chair and pulled over hips in supported stance at [facility] with MOD I.)  Toileting Assistance with Device: Modified independent (Pt completed toileting hygiene and clothing management in stance at [facility] with MOD I.)    Activity Tolerance:  Endurance: Endurance does not limit participation in activity    Bed Mobility/Transfers:   Bed Mobility  Bed Mobility: No  Transfers  Transfer: Yes (Sit to/from stand and ambulatory toilet transfer with SBA, pt c/o mild nausea but has improved since earlier in AM.)    Ambulation/Gait Training:  Functional Mobility  Functional Mobility Performed: Yes (Pt completed functional mobility throughout room and bathroom using FWW with SBA.)    Sitting Balance:  Static Sitting Balance  Static Sitting-Comment/Number of Minutes: good  Dynamic Sitting Balance  Dynamic Sitting-Comments: good    Standing Balance:  Static Standing Balance  Static Standing-Comment/Number of Minutes: fair+  Dynamic Standing Balance  Dynamic Standing-Comments: fair+    Strength:  Strength Comments: BUE strength grossly  5/5    Hand Function:  Hand Function  Gross Grasp: Functional    Extremities: RUE   RUE : Within Functional Limits (AROM) and LUE   LUE: Within Functional Limits (AROM)    Outcome Measures: [facility] Daily Activity  Putting on and taking off regular lower body clothing: None  Bathing (including washing, rinsing, drying): A little  Putting on and taking off regular upper body clothing: None  Toileting, which includes using toilet, bedpan or urinal: None  Taking care of personal grooming such as brushing teeth: None  Eating Meals: None  Daily Activity - Total Score: 23                       EDUCATION:  Education  Individual(s) Educated: Patient  Education Provided: Fall precautons, Risk and benefits of OT discussed with patient or other, POC discussed and agreed upon  Education Comment: Pt educated on compensatory LB ADL techniques, techniques during functional transfers, and anterior hip precautions. Pt verbalized/demo good understanding of all education.  Education Documentation  Handouts, taught by Rosmery Lomax OT at 2/11/2025  8:11 AM.  Learner: Patient  Readiness: Acceptance  Method: Explanation, Demonstration, Handout  Response: Verbalizes Understanding, Demonstrated Understanding    Body Mechanics, taught by Rosmery Lomax OT at 2/11/2025  8:11 AM.  Learner: Patient  Readiness: Acceptance  Method: Explanation, Demonstration, Handout  Response: Verbalizes Understanding, Demonstrated Understanding    Precautions, taught by Rosmery Lomax OT at 2/11/2025  8:11 AM.  Learner: Patient  Readiness: Acceptance  Method: Explanation, Demonstration, Handout  Response: Verbalizes Understanding, Demonstrated Understanding    ADL Training, taught by Rosmery Lomax OT at 2/11/2025  8:11 AM.  Learner: Patient  Readiness: Acceptance  Method: Explanation, Demonstration, Handout  Response: Verbalizes Understanding, Demonstrated Understanding

## 2025-02-11 NOTE — PROGRESS NOTES
Hip surgery    Progress Note    Subjective:     Post-Operative Day # 1   Status Post right Hip Arthroplasty    Systemic or Specific Complaints: No Complaints    Objective:     Visit Vitals  /78 (BP Location: Left arm, Patient Position: Lying)   Pulse 61   Temp 36.5 °C (97.7 °F) (Temporal)   Resp 16       General: alert and oriented, in no acute distress, appears stated age, and cooperative   Wound: no erythema, no edema, no drainage, and dressing remains clean, dry, and intact   Motion: Painful range of Motion   DVT Exam: No evidence of DVT seen on physical exam.  Negative Fahad's sign.  No significant calf/ankle edema.       NVI in lower extremity. right thigh soft to palpation. Strong equal dorsi/plantar flexion bilaterally. Good distal pulses bilaterally.      Data Review  Recent Results (from the past 24 hours)   POCT pregnancy, urine    Collection Time: 02/10/25 10:18 AM   Result Value Ref Range    Preg Test, Ur Negative Negative   CBC    Collection Time: 02/11/25  5:02 AM   Result Value Ref Range    WBC 13.9 (H) 4.4 - 11.3 x10*3/uL    nRBC 0.0 0.0 - 0.0 /100 WBCs    RBC 3.57 (L) 4.00 - 5.20 x10*6/uL    Hemoglobin 11.4 (L) 12.0 - 16.0 g/dL    Hematocrit 32.8 (L) 36.0 - 46.0 %    MCV 92 80 - 100 fL    MCH 31.9 26.0 - 34.0 pg    MCHC 34.8 32.0 - 36.0 g/dL    RDW 11.9 11.5 - 14.5 %    Platelets 242 150 - 450 x10*3/uL   Basic metabolic panel    Collection Time: 02/11/25  5:02 AM   Result Value Ref Range    Glucose 144 (H) 74 - 99 mg/dL    Sodium 134 (L) 136 - 145 mmol/L    Potassium 4.5 3.5 - 5.3 mmol/L    Chloride 106 98 - 107 mmol/L    Bicarbonate 24 21 - 32 mmol/L    Anion Gap 9 (L) 10 - 20 mmol/L    Urea Nitrogen 13 6 - 23 mg/dL    Creatinine 0.83 0.50 - 1.05 mg/dL    eGFR 85 >60 mL/min/1.73m*2    Calcium 8.5 (L) 8.6 - 10.3 mg/dL   SST TOP    Collection Time: 02/11/25  5:02 AM   Result Value Ref Range    Extra Tube Hold for add-ons.      XR pelvis 1-2 views    Result Date: 2/10/2025  Interpreted By:   Amber Wright, STUDY: Single view pelvis.   INDICATION: Signs/Symptoms:Post op hip.   COMPARISON: 11/21/2024.   ACCESSION NUMBER(S): [id number]   ORDERING CLINICIAN: JIMMY LARSON   FINDINGS: No acute fracture or malalignment. Immediate postsurgical changes of right total hip arthroplasty. Hardware is intact without perihardware fractures or lucencies. Expected postsurgical soft tissue gas within the surrounding soft tissues.   Left hip joint space is well-maintained.       1. Immediate postsurgical changes of right total hip arthroplasty without hardware complication.   MACRO: None.   Signed by: Amber Wright 2/10/2025 3:49 PM Dictation workstation:   [id number]    FL fluoro images no charge    Result Date: 2/10/2025  These images are not reportable by radiology and will not be interpreted by  Radiologists.    ECG 12 Lead    Result Date: 1/31/2025  Sinus bradycardia Otherwise normal ECG No previous ECGs available Confirmed by Rene Jane ([id number]) on 1/31/2025 4:51:37 PM      Assessment:     Status Post right Hip Arthroplasty. Doing well postoperatively. No acute events overnight.     Patient with some post op N/V, relieved by zofran. Reports feeling better this morning.     Acute postoperative pain: Reports mild to moderate pain to operative extremity. Exacerbated by movement, relieved by rest ice and pain medication. Continue current pain management.     Acute postoperative blood loss anemia: Secondary to expected surgical blood loss. Hemoglobin this A.M. 11.4.  Patient asymptomatic, and remains hemodynamically stable with no signs of active bleeding.     Leukocytosis: WBC count this morning 13.9.  No acute signs of infection.  Patient afebrile, remains hemodynamically stable denies any cough, abdominal pain, or urinary symptoms. Transient elevation in white blood count is most likely secondary to stress and inflammatory process from surgery.    Plan:      1.  Discharge today, Return to Clinic: in 2  weeks    2.  Continue Deep venous thrombosis prophylaxis: Aspirin 81 mg BID for 30 days  3.  Continue physical therapy: Weight bearing as tolerated with anterior hip precautions to operative extremity  4.  Continue Pain Control: scripts sent  5.  Discharge planning: patient plans to return to home with home care at discharge, orders placed. [facility] and [facility] following for discharge planning.       AMBER García   2/11/2025 8:09 AM

## 2025-02-11 NOTE — SIGNIFICANT EVENT
Messaged by nurse that patient was tachypneic with a respiratory rate of 24, heart rate 69, /90.  Nurse stated that patient was in significant pain at 10/10 and Dilaudid was administered.  Up to see patient and she states that her pain is much better and that she is no longer feeling anxious.  Respirations now 14.  Let the patient know that if she is feeling any significant breakthrough pain to please let the nurse know so that night NP can be notified.  Patient stable at this time and significant other at bedside.

## 2025-02-11 NOTE — CARE PLAN
Problem: Chronic Conditions and Co-morbidities  Goal: Patient's chronic conditions and co-morbidity symptoms are monitored and maintained or improved  2/11/2025 1308 by Angelito Tejeda RN  Outcome: Adequate for Discharge  2/11/2025 0757 by Angelito Tejeda RN  Outcome: Progressing     Problem: Nutrition  Goal: Nutrient intake appropriate for maintaining nutritional needs  2/11/2025 1308 by Angelito Tejeda RN  Outcome: Adequate for Discharge  2/11/2025 0757 by Angelito Tejeda RN  Outcome: Progressing     Problem: Pain  Goal: Takes deep breaths with improved pain control throughout the shift  2/11/2025 1308 by Angelito Tejeda RN  Outcome: Adequate for Discharge  2/11/2025 0757 by Angelito Tejeda RN  Outcome: Progressing  Goal: Turns in bed with improved pain control throughout the shift  2/11/2025 1308 by Angelito Tejeda RN  Outcome: Adequate for Discharge  2/11/2025 0757 by Angelito Tejeda RN  Outcome: Progressing  Goal: Walks with improved pain control throughout the shift  2/11/2025 1308 by Angelito Tejeda RN  Outcome: Adequate for Discharge  2/11/2025 0757 by Angelito Tejeda RN  Outcome: Progressing  Goal: Performs ADL's with improved pain control throughout shift  2/11/2025 1308 by Angelito Tejeda RN  Outcome: Adequate for Discharge  2/11/2025 0757 by Angelito Tejeda RN  Outcome: Progressing  Goal: Participates in PT with improved pain control throughout the shift  2/11/2025 1308 by Angelito Tejeda RN  Outcome: Adequate for Discharge  2/11/2025 0757 by Angelito Tejeda RN  Outcome: Progressing  Goal: Free from opioid side effects throughout the shift  2/11/2025 1308 by Angelito Tejeda RN  Outcome: Adequate for Discharge  2/11/2025 0757 by Angelito Tejeda RN  Outcome: Progressing  Goal: Free from acute confusion related to pain meds throughout the shift  2/11/2025 1308 by Angelito Tejeda RN  Outcome: Adequate for Discharge  2/11/2025 0757 by Angelito Tejeda RN  Outcome: Progressing     Problem: Skin  Goal:  Decreased wound size/increased tissue granulation at next dressing change  2/11/2025 1308 by Angelito Tejeda RN  Outcome: Adequate for Discharge  2/11/2025 0757 by Angelito Tejeda RN  Outcome: Progressing  Goal: Participates in plan/prevention/treatment measures  2/11/2025 1308 by Angelito Tejeda RN  Outcome: Adequate for Discharge  2/11/2025 0757 by Angelito Tejeda RN  Outcome: Progressing  Goal: Prevent/manage excess moisture  2/11/2025 1308 by Angelito Tejeda RN  Outcome: Adequate for Discharge  2/11/2025 0757 by Angelito Tejeda RN  Outcome: Progressing  Goal: Prevent/minimize sheer/friction injuries  2/11/2025 1308 by Angelito Tejeda RN  Outcome: Adequate for Discharge  2/11/2025 0757 by Angelito Tejeda RN  Outcome: Progressing  Goal: Promote/optimize nutrition  2/11/2025 1308 by Angelito Tejeda RN  Outcome: Adequate for Discharge  2/11/2025 0757 by Angelito Tejeda RN  Outcome: Progressing  Goal: Promote skin healing  2/11/2025 1308 by Angelito Tejeda RN  Outcome: Adequate for Discharge  2/11/2025 0757 by Angelito Tejeda RN  Outcome: Progressing     Problem: Fall/Injury  Goal: Not fall by end of shift  2/11/2025 1308 by Angelito Tejeda RN  Outcome: Adequate for Discharge  2/11/2025 0757 by Angelito Tejeda RN  Outcome: Progressing  Goal: Be free from injury by end of the shift  2/11/2025 1308 by Angelito Tejeda RN  Outcome: Adequate for Discharge  2/11/2025 0757 by Angelito Tejeda RN  Outcome: Progressing  Goal: Verbalize understanding of personal risk factors for fall in the hospital  2/11/2025 1308 by Angelito Tejeda RN  Outcome: Adequate for Discharge  2/11/2025 0757 by Angelito Tejeda RN  Outcome: Progressing  Goal: Verbalize understanding of risk factor reduction measures to prevent injury from fall in the home  2/11/2025 1308 by Angelito eTjeda RN  Outcome: Adequate for Discharge  2/11/2025 0757 by Angelito Tejeda, RN  Outcome: Progressing  Goal: Use assistive devices by end of the shift  2/11/2025 1308 by  Angelito Tejeda RN  Outcome: Adequate for Discharge  2/11/2025 0757 by Angelito Tejeda RN  Outcome: Progressing  Goal: Pace activities to prevent fatigue by end of the shift  2/11/2025 1308 by Angelito Tejeda RN  Outcome: Adequate for Discharge  2/11/2025 075 by Angelito Tejeda RN  Outcome: Progressing   The patient's goals for the shift include      The clinical goals for the shift include Nausea and pain control

## 2025-02-11 NOTE — CARE PLAN
Problem: Chronic Conditions and Co-morbidities  Goal: Patient's chronic conditions and co-morbidity symptoms are monitored and maintained or improved  Outcome: Progressing     Problem: Nutrition  Goal: Nutrient intake appropriate for maintaining nutritional needs  Outcome: Progressing     Problem: Pain  Goal: Takes deep breaths with improved pain control throughout the shift  Outcome: Progressing  Goal: Turns in bed with improved pain control throughout the shift  Outcome: Progressing  Goal: Walks with improved pain control throughout the shift  Outcome: Progressing  Goal: Performs ADL's with improved pain control throughout shift  Outcome: Progressing  Goal: Participates in PT with improved pain control throughout the shift  Outcome: Progressing  Goal: Free from opioid side effects throughout the shift  Outcome: Progressing  Goal: Free from acute confusion related to pain meds throughout the shift  Outcome: Progressing     Problem: Skin  Goal: Decreased wound size/increased tissue granulation at next dressing change  Outcome: Progressing  Goal: Participates in plan/prevention/treatment measures  Outcome: Progressing  Goal: Prevent/manage excess moisture  Outcome: Progressing  Goal: Prevent/minimize sheer/friction injuries  Outcome: Progressing  Goal: Promote/optimize nutrition  Outcome: Progressing  Goal: Promote skin healing  Outcome: Progressing     Problem: Fall/Injury  Goal: Not fall by end of shift  Outcome: Progressing  Goal: Be free from injury by end of the shift  Outcome: Progressing  Goal: Verbalize understanding of personal risk factors for fall in the hospital  Outcome: Progressing  Goal: Verbalize understanding of risk factor reduction measures to prevent injury from fall in the home  Outcome: Progressing  Goal: Use assistive devices by end of the shift  Outcome: Progressing  Goal: Pace activities to prevent fatigue by end of the shift  Outcome: Progressing   The patient's goals for the shift include       The clinical goals for the shift include Nausea and pain control

## 2025-02-27 DIAGNOSIS — Z96.641 S/P TOTAL RIGHT HIP ARTHROPLASTY: Primary | ICD-10-CM

## 2025-03-03 NOTE — PROGRESS NOTES
No chief complaint on file.      The patient is here for follow-up of their side: right direct anterior hip arthroplasty.  The patient has mild hip pain.  The patient has no mechanical symptoms.  The patient is approximately 3 weeks postop.    Physical examination:    Examination of the side: right hip  The incision is healing well  No erythema or warmth.  Range of motion: Forward Flexion: 120 Internal Rotation: 30 External Rotation: 30 Abduction 30  The Patient can single leg stand and actively abduct and actively flex at the hip  Calf is soft, Homans negative  The patient has intact ankle dorsiflexion and plantarflexion.    Radiographs:  See dictated report        Impression:  Status post side: right total hip arthroplasty    Plan:  Continue compression socks and baby aspirin for 30 days postop  Follow up in  9 weeks with Xr   All questions answered

## 2025-03-04 ENCOUNTER — HOSPITAL ENCOUNTER (OUTPATIENT)
Dept: RADIOLOGY | Facility: HOSPITAL | Age: 50
Discharge: HOME | End: 2025-03-04
Payer: COMMERCIAL

## 2025-03-04 ENCOUNTER — APPOINTMENT (OUTPATIENT)
Dept: ORTHOPEDIC SURGERY | Facility: CLINIC | Age: 50
End: 2025-03-04
Payer: COMMERCIAL

## 2025-03-04 DIAGNOSIS — Z96.641 S/P TOTAL RIGHT HIP ARTHROPLASTY: ICD-10-CM

## 2025-03-04 DIAGNOSIS — Z96.641 S/P TOTAL RIGHT HIP ARTHROPLASTY: Primary | ICD-10-CM

## 2025-03-04 PROCEDURE — 73502 X-RAY EXAM HIP UNI 2-3 VIEWS: CPT | Mod: RIGHT SIDE | Performed by: RADIOLOGY

## 2025-03-04 PROCEDURE — 99024 POSTOP FOLLOW-UP VISIT: CPT | Performed by: ORTHOPAEDIC SURGERY

## 2025-03-04 PROCEDURE — 73502 X-RAY EXAM HIP UNI 2-3 VIEWS: CPT | Mod: RT

## 2025-06-04 ENCOUNTER — TELEPHONE (OUTPATIENT)
Dept: ORTHOPEDIC SURGERY | Facility: CLINIC | Age: 50
End: 2025-06-04
Payer: COMMERCIAL

## 2025-06-04 NOTE — TELEPHONE ENCOUNTER
Patient has a dental appointment and was told she needs an antibiotic before her cleanings.   DOS: THR 2-10-25  Patient would like medication sent to [facility] in [place] on [address]

## 2025-06-05 DIAGNOSIS — Z96.641 S/P TOTAL RIGHT HIP ARTHROPLASTY: Primary | ICD-10-CM

## 2025-06-05 RX ORDER — AMOXICILLIN 500 MG/1
CAPSULE ORAL
Qty: 4 CAPSULE | Refills: 4 | Status: SHIPPED | OUTPATIENT
Start: 2025-06-05

## (undated) DEVICE — TOWEL PACK, STERILE, 16X24, XRAY DETECTABLE, BLUE, 4/PK

## (undated) DEVICE — SUTURE, VICRYL, 1, 36 IN, V-34, VIOLET

## (undated) DEVICE — GOWN, SURGICAL, ROYAL SILK, XXL, STERILE

## (undated) DEVICE — PAD, POST, PERINEAL, ADULT

## (undated) DEVICE — GLOVE, SURGICAL, PROTEXIS PI , 8.5, PF, LF

## (undated) DEVICE — STRAP, ARM BOARD, 32 X 1.5

## (undated) DEVICE — STRAP, VELCRO, BODY, 4 X 60IN, NS

## (undated) DEVICE — HOOD, SURGICAL, FLYTE, T7

## (undated) DEVICE — DRAPE, C-ARM IMAGE

## (undated) DEVICE — Device

## (undated) DEVICE — DRAPE, SHEET, U, W/ADHESIVE STRIP, IMPERVIOUS, 60 X 70 IN, DISPOSABLE, LF, STERILE

## (undated) DEVICE — SOLUTION, INJECTION, USP, SODIUM CHLORIDE 0.9%, .9, NACL, 1000 ML, BAG

## (undated) DEVICE — CAUTERY, PENCIL, PUSH BUTTON, SMOKE EVAC, 70MM

## (undated) DEVICE — GLOVE, SURGICAL, PROTEXIS PI BLUE W/NEUTHERA, 7.5, PF, LF

## (undated) DEVICE — MAT, FLOOR, STANDARD FLUID BARRIER, 32X44, GREEN

## (undated) DEVICE — ELECTRODE, ELECTROSURGICAL, BLADE, EXTENDED

## (undated) DEVICE — APPLICATOR, CHLORAPREP, W/ORANGE TINT, 26ML

## (undated) DEVICE — GLOVE, SURGICAL, PROTEXIS PI , 7.5, PF, LF

## (undated) DEVICE — SOLUTION, IRRIGATION, USP, SODIUM CHLORIDE 0.9%, 3000 ML

## (undated) DEVICE — DRESSING, MEPILEX BORDER, POST-OP AG, 4 X 10 IN

## (undated) DEVICE — SUTURE, MONOCRYL, 4-0, 27 IN, PS-2, UNDYED

## (undated) DEVICE — WOUND SYSTEM, DEBRIDEMENT & CLEANING, O.R DUOPAK

## (undated) DEVICE — SEALER, BIPOLAR, AQUA MANTYS 6.0

## (undated) DEVICE — PROTECTOR, NERVE, ULNAR, PINK

## (undated) DEVICE — MANIFOLD, 4 PORT NEPTUNE STANDARD

## (undated) DEVICE — COVER, FOOT PEDAL, HANA

## (undated) DEVICE — SUTURE, ETHIBOND XTRA, 1, 30 IN, CTX, LF

## (undated) DEVICE — BLADE, SAW, SAGITTAL, 21 X 84.5 X 0.89 MM, STAINLESS STEEL, STERILE

## (undated) DEVICE — ADHESIVE, SKIN, DERMABOND ADVANCED, 15CM, PEN-STYLE